# Patient Record
Sex: FEMALE | Race: BLACK OR AFRICAN AMERICAN | NOT HISPANIC OR LATINO | Employment: OTHER | URBAN - METROPOLITAN AREA
[De-identification: names, ages, dates, MRNs, and addresses within clinical notes are randomized per-mention and may not be internally consistent; named-entity substitution may affect disease eponyms.]

---

## 2017-05-27 PROCEDURE — 99283 EMERGENCY DEPT VISIT LOW MDM: CPT

## 2017-05-27 RX ORDER — NIFEDIPINE 90 MG/1
60 TABLET, EXTENDED RELEASE ORAL DAILY
COMMUNITY
End: 2017-07-24 | Stop reason: ALTCHOICE

## 2017-05-28 ENCOUNTER — HOSPITAL ENCOUNTER (EMERGENCY)
Facility: HOSPITAL | Age: 27
Discharge: HOME | End: 2017-05-28
Attending: EMERGENCY MEDICINE
Payer: MEDICARE

## 2017-05-28 VITALS
BODY MASS INDEX: 36.22 KG/M2 | DIASTOLIC BLOOD PRESSURE: 99 MMHG | TEMPERATURE: 98 F | SYSTOLIC BLOOD PRESSURE: 156 MMHG | WEIGHT: 239 LBS | HEART RATE: 68 BPM | HEIGHT: 68 IN | OXYGEN SATURATION: 99 % | RESPIRATION RATE: 18 BRPM

## 2017-05-28 DIAGNOSIS — R51.9 ACUTE INTRACTABLE HEADACHE, UNSPECIFIED HEADACHE TYPE: Primary | ICD-10-CM

## 2017-05-28 RX ORDER — BUTALBITAL, ACETAMINOPHEN AND CAFFEINE 50; 325; 40 MG/1; MG/1; MG/1
1 TABLET ORAL EVERY 4 HOURS PRN
Qty: 14 TABLET | Refills: 0 | Status: SHIPPED | OUTPATIENT
Start: 2017-05-28 | End: 2017-06-27

## 2017-05-28 RX ORDER — HYDROCHLOROTHIAZIDE 25 MG/1
25 TABLET ORAL DAILY
Qty: 30 TABLET | Refills: 0 | Status: SHIPPED | OUTPATIENT
Start: 2017-05-28 | End: 2018-11-28

## 2017-05-28 NOTE — ED PROVIDER NOTES
Encounter Date: 2017       History     Chief Complaint   Patient presents with    Headache     pt. reports headache x2 days.. bp elevated today 160/112. pt. is not taking bp medication (procardia)     Review of patient's allergies indicates:  No Known Allergies  Pt is a 27 yo female w a h/o HTN noncompliant with her BP meds. She developed preeclampsia and had a miscarriage in 3/17 she was prescribed nicardipine has only taken 3 tabs since March because she felt it was too strong. She comes today with 2 days of left sided HA. Pt declines any lab workup and want to f/u with her doctor Monday.            Past Medical History:   Diagnosis Date    Hypertension      Past Surgical History:   Procedure Laterality Date     SECTION, LOW TRANSVERSE       No family history on file.  Social History   Substance Use Topics    Smoking status: Never Smoker    Smokeless tobacco: Not on file    Alcohol use Yes      Comment: occasional     Review of Systems   Constitutional: Negative for fatigue.   HENT: Positive for congestion and rhinorrhea. Negative for ear pain and sore throat.    Eyes: Negative for visual disturbance.   Respiratory: Negative for cough, choking, chest tightness, shortness of breath, wheezing and stridor.    Cardiovascular: Negative for chest pain, palpitations and leg swelling.   Gastrointestinal: Negative for abdominal distention, abdominal pain, nausea and vomiting.   Musculoskeletal: Negative for arthralgias, neck pain and neck stiffness.   Neurological: Positive for headaches (right temporal).   All other systems reviewed and are negative.      Physical Exam     Initial Vitals [17 2342]   BP Pulse Resp Temp SpO2   138/88 75 16 98.1 °F (36.7 °C) 97 %     Physical Exam    Nursing note and vitals reviewed.  Constitutional: Vital signs are normal. She appears well-developed and well-nourished. She is not diaphoretic. She appears distressed.   HENT:   Head: Normocephalic and atraumatic.    Right Ear: External ear normal.   Left Ear: External ear normal.   Nose: Nose normal.   Mouth/Throat: Oropharynx is clear and moist.   Eyes: Conjunctivae and EOM are normal. Pupils are equal, round, and reactive to light.   Neck: Normal range of motion. Neck supple.   Cardiovascular: Normal rate, regular rhythm and normal heart sounds.   Pulmonary/Chest: Breath sounds normal. No respiratory distress. She has no wheezes. She has no rhonchi. She has no rales.   Abdominal: Soft. She exhibits no distension. There is no tenderness. There is no rebound and no guarding.   Musculoskeletal: Normal range of motion. She exhibits no edema or tenderness.   Neurological: She is alert and oriented to person, place, and time.   Skin: Skin is warm and dry.   Psychiatric: She has a normal mood and affect.         ED Course   Procedures  Labs Reviewed - No data to display                       Attending Attestation:             Attending ED Notes:   Pt refuses lab workup and wishes to be dc'd to f/u with PCP Monday.           ED Course     Clinical Impression:   The encounter diagnosis was Acute intractable headache, unspecified headache type.    Disposition:   Disposition: Discharged  Condition: Stable       Tod Restrepo MD  05/28/17 0204

## 2017-05-28 NOTE — ED NOTES
Pt presents to ED c/o HTN. States she took her BP at [facility] and it was 169/112. HA X 3 days ago. Pt reports states yesterday she took Advil for HA with slight relief. Pt denies chest pain, or SOB. Pt reports she is prescribed nifedipine for her BP, but she does not take the medication because it results in HA. Reports pain is 6/10. Pt reports she has been suffering with sinus problems X 1 month. Clear drainage from nose. Reports dry congested cough.

## 2017-07-24 ENCOUNTER — HOSPITAL ENCOUNTER (EMERGENCY)
Facility: HOSPITAL | Age: 27
Discharge: HOME | End: 2017-07-24
Attending: EMERGENCY MEDICINE
Payer: MEDICARE

## 2017-07-24 VITALS
DIASTOLIC BLOOD PRESSURE: 88 MMHG | HEART RATE: 88 BPM | HEIGHT: 68 IN | TEMPERATURE: 98 F | OXYGEN SATURATION: 96 % | RESPIRATION RATE: 18 BRPM | BODY MASS INDEX: 34.86 KG/M2 | WEIGHT: 230 LBS | SYSTOLIC BLOOD PRESSURE: 136 MMHG

## 2017-07-24 DIAGNOSIS — N76.0 ACUTE VAGINITIS: Primary | ICD-10-CM

## 2017-07-24 LAB
B-HCG UR QL: NEGATIVE
BACTERIA #/AREA URNS HPF: ABNORMAL /HPF
BILIRUB UR QL STRIP: NEGATIVE
CLARITY UR: CLEAR
COLOR UR: YELLOW
CTP QC/QA: YES
GLUCOSE UR QL STRIP: NEGATIVE
HGB UR QL STRIP: ABNORMAL
HYALINE CASTS #/AREA URNS LPF: 0 /LPF
KETONES UR QL STRIP: ABNORMAL
LEUKOCYTE ESTERASE UR QL STRIP: NEGATIVE
MICROSCOPIC COMMENT: ABNORMAL
NITRITE UR QL STRIP: NEGATIVE
PH UR STRIP: 6 [PH] (ref 5–8)
PROT UR QL STRIP: ABNORMAL
RBC #/AREA URNS HPF: 2 /HPF (ref 0–4)
SP GR UR STRIP: 1.02 (ref 1–1.03)
URN SPEC COLLECT METH UR: ABNORMAL
UROBILINOGEN UR STRIP-ACNC: 1 EU/DL
WBC #/AREA URNS HPF: 5 /HPF (ref 0–5)

## 2017-07-24 PROCEDURE — 99283 EMERGENCY DEPT VISIT LOW MDM: CPT

## 2017-07-24 PROCEDURE — 81025 URINE PREGNANCY TEST: CPT | Performed by: EMERGENCY MEDICINE

## 2017-07-24 PROCEDURE — 81000 URINALYSIS NONAUTO W/SCOPE: CPT

## 2017-07-24 RX ORDER — ASPIRIN 325 MG
1 TABLET, DELAYED RELEASE (ENTERIC COATED) ORAL NIGHTLY
Qty: 7 TUBE | Refills: 0 | Status: SHIPPED | OUTPATIENT
Start: 2017-07-24 | End: 2017-07-31

## 2017-07-24 RX ORDER — AMOXICILLIN 500 MG/1
500 CAPSULE ORAL
COMMUNITY
End: 2018-11-26

## 2017-07-25 NOTE — ED PROVIDER NOTES
Encounter Date: 2017       History     Chief Complaint   Patient presents with    Female  Problem     vaginal discomfort with burning upon urination x3 days     The patient presents to the emergency department with vaginal itching and burning today.  The patient denies a vaginal discharge.  She denies any abdominal pain.  She denies any UTI symptoms.  The patient has been on antibiotics, amoxicillin for the past 4 days for a tooth infection.          Review of patient's allergies indicates:  No Known Allergies  Past Medical History:   Diagnosis Date    Hypertension      Past Surgical History:   Procedure Laterality Date     SECTION, LOW TRANSVERSE       History reviewed. No pertinent family history.  Social History   Substance Use Topics    Smoking status: Never Smoker    Smokeless tobacco: Never Used    Alcohol use Yes      Comment: occasional     Review of Systems   Constitutional: Negative for fever.   HENT: Negative for sore throat.    Respiratory: Negative for shortness of breath.    Cardiovascular: Negative for chest pain.   Gastrointestinal: Negative for nausea.   Genitourinary: Negative for dysuria.   Musculoskeletal: Negative for back pain.   Skin: Negative for rash.   Neurological: Negative for weakness.   Hematological: Does not bruise/bleed easily.       Physical Exam     Initial Vitals [17]   BP Pulse Resp Temp SpO2   (!) 143/90 96 18 98.5 °F (36.9 °C) 98 %      MAP       107.67         Physical Exam    Nursing note and vitals reviewed.  Constitutional: She appears well-developed and well-nourished.   HENT:   Head: Normocephalic and atraumatic.   Neck: Normal range of motion. Neck supple.   Abdominal: Soft. Bowel sounds are normal. She exhibits no distension. There is no tenderness.   Genitourinary:   Genitourinary Comments: Vaginal and vulva very dry, no yeast noted. No erythema, no lesions.   Neurological: She is alert and oriented to person, place, and time.   Skin:  Skin is warm and dry. No rash noted.   Psychiatric: She has a normal mood and affect. Her behavior is normal. Judgment and thought content normal.         ED Course   Procedures  Labs Reviewed   URINALYSIS - Abnormal; Notable for the following:        Result Value    Protein, UA 2+ (*)     Ketones, UA Trace (*)     Occult Blood UA Trace (*)     All other components within normal limits   URINALYSIS MICROSCOPIC - Abnormal; Notable for the following:     Bacteria, UA Few (*)     All other components within normal limits   POCT URINE PREGNANCY             Medical Decision Making:   Clinical Tests:   Lab Tests: Ordered and Reviewed                   ED Course   Value Comment By Time   Microscopic Comment: SEE COMMENT (Reviewed) Bhavya Davis MD 07/24 3516     Clinical Impression:   The encounter diagnosis was Acute vaginitis.                           Bhavya Davis MD  07/24/17 6465

## 2017-07-25 NOTE — ED NOTES
Pt presents to ed with c/o burning with urination and vaginal itching for approximately 1 week and worsening today. Reports possible exposure to std. Denies any discharge. Denies flank pain. Denies fever. Pt is aaox4, neurologically intact, will continue to monitor.

## 2017-10-30 ENCOUNTER — HOSPITAL ENCOUNTER (EMERGENCY)
Facility: HOSPITAL | Age: 27
Discharge: LEFT BEFORE DISCHARGE | End: 2017-10-30
Attending: EMERGENCY MEDICINE
Payer: MEDICARE

## 2017-10-30 VITALS
SYSTOLIC BLOOD PRESSURE: 151 MMHG | HEIGHT: 68 IN | WEIGHT: 230 LBS | BODY MASS INDEX: 34.86 KG/M2 | HEART RATE: 86 BPM | TEMPERATURE: 99 F | DIASTOLIC BLOOD PRESSURE: 89 MMHG | RESPIRATION RATE: 20 BRPM | OXYGEN SATURATION: 98 %

## 2017-10-30 DIAGNOSIS — N84.0 ENDOMETRIAL POLYP: ICD-10-CM

## 2017-10-30 DIAGNOSIS — R10.2 PELVIC PAIN: Primary | ICD-10-CM

## 2017-10-30 LAB
ALBUMIN SERPL BCP-MCNC: 3.4 G/DL
ALP SERPL-CCNC: 53 U/L
ALT SERPL W/O P-5'-P-CCNC: 25 U/L
ANION GAP SERPL CALC-SCNC: 6 MMOL/L
AST SERPL-CCNC: 22 U/L
B-HCG UR QL: NEGATIVE
BACTERIA #/AREA URNS HPF: NORMAL /HPF
BACTERIA GENITAL QL WET PREP: ABNORMAL
BASOPHILS # BLD AUTO: 0.03 K/UL
BASOPHILS NFR BLD: 0.4 %
BILIRUB SERPL-MCNC: 0.6 MG/DL
BILIRUB UR QL STRIP: NEGATIVE
BUN SERPL-MCNC: 3 MG/DL
CALCIUM SERPL-MCNC: 9.2 MG/DL
CHLORIDE SERPL-SCNC: 111 MMOL/L
CLARITY UR: CLEAR
CLUE CELLS VAG QL WET PREP: ABNORMAL
CO2 SERPL-SCNC: 24 MMOL/L
COLOR UR: YELLOW
CREAT SERPL-MCNC: 0.7 MG/DL
CTP QC/QA: YES
DIFFERENTIAL METHOD: ABNORMAL
EOSINOPHIL # BLD AUTO: 0.7 K/UL
EOSINOPHIL NFR BLD: 8.3 %
ERYTHROCYTE [DISTWIDTH] IN BLOOD BY AUTOMATED COUNT: 13.8 %
EST. GFR  (AFRICAN AMERICAN): >60 ML/MIN/1.73 M^2
EST. GFR  (NON AFRICAN AMERICAN): >60 ML/MIN/1.73 M^2
FILAMENT FUNGI VAG WET PREP-#/AREA: ABNORMAL
GLUCOSE SERPL-MCNC: 95 MG/DL
GLUCOSE UR QL STRIP: NEGATIVE
HCG INTACT+B SERPL-ACNC: 15 MIU/ML
HCT VFR BLD AUTO: 38.5 %
HGB BLD-MCNC: 12.7 G/DL
HGB UR QL STRIP: NEGATIVE
HYALINE CASTS #/AREA URNS LPF: 0 /LPF
KETONES UR QL STRIP: NEGATIVE
LEUKOCYTE ESTERASE UR QL STRIP: NEGATIVE
LYMPHOCYTES # BLD AUTO: 3.3 K/UL
LYMPHOCYTES NFR BLD: 40.3 %
MCH RBC QN AUTO: 29.8 PG
MCHC RBC AUTO-ENTMCNC: 33 G/DL
MCV RBC AUTO: 90 FL
MICROSCOPIC COMMENT: NORMAL
MONOCYTES # BLD AUTO: 0.7 K/UL
MONOCYTES NFR BLD: 8.1 %
NEUTROPHILS # BLD AUTO: 3.5 K/UL
NEUTROPHILS NFR BLD: 42.8 %
NITRITE UR QL STRIP: NEGATIVE
PH UR STRIP: 6 [PH] (ref 5–8)
PLATELET # BLD AUTO: 289 K/UL
PMV BLD AUTO: 9.3 FL
POTASSIUM SERPL-SCNC: 3.8 MMOL/L
PROT SERPL-MCNC: 7.1 G/DL
PROT UR QL STRIP: ABNORMAL
RBC # BLD AUTO: 4.26 M/UL
RBC #/AREA URNS HPF: 1 /HPF (ref 0–4)
SODIUM SERPL-SCNC: 141 MMOL/L
SP GR UR STRIP: >=1.03 (ref 1–1.03)
SPECIMEN SOURCE: ABNORMAL
T VAGINALIS GENITAL QL WET PREP: ABNORMAL
URN SPEC COLLECT METH UR: ABNORMAL
UROBILINOGEN UR STRIP-ACNC: NEGATIVE EU/DL
WBC # BLD AUTO: 8.28 K/UL
WBC #/AREA URNS HPF: 2 /HPF (ref 0–5)
WBC #/AREA VAG WET PREP: ABNORMAL
YEAST GENITAL QL WET PREP: ABNORMAL

## 2017-10-30 PROCEDURE — 99284 EMERGENCY DEPT VISIT MOD MDM: CPT | Mod: 25

## 2017-10-30 PROCEDURE — 96360 HYDRATION IV INFUSION INIT: CPT

## 2017-10-30 PROCEDURE — 87210 SMEAR WET MOUNT SALINE/INK: CPT

## 2017-10-30 PROCEDURE — 25000003 PHARM REV CODE 250: Performed by: EMERGENCY MEDICINE

## 2017-10-30 PROCEDURE — 85025 COMPLETE CBC W/AUTO DIFF WBC: CPT

## 2017-10-30 PROCEDURE — 87591 N.GONORRHOEAE DNA AMP PROB: CPT

## 2017-10-30 PROCEDURE — 81000 URINALYSIS NONAUTO W/SCOPE: CPT

## 2017-10-30 PROCEDURE — 84702 CHORIONIC GONADOTROPIN TEST: CPT

## 2017-10-30 PROCEDURE — 81025 URINE PREGNANCY TEST: CPT | Performed by: EMERGENCY MEDICINE

## 2017-10-30 PROCEDURE — 80053 COMPREHEN METABOLIC PANEL: CPT

## 2017-10-30 RX ORDER — SODIUM CHLORIDE 9 MG/ML
125 INJECTION, SOLUTION INTRAVENOUS CONTINUOUS
Status: DISCONTINUED | OUTPATIENT
Start: 2017-10-30 | End: 2017-10-30

## 2017-10-30 RX ADMIN — SODIUM CHLORIDE 1000 ML: 0.9 INJECTION, SOLUTION INTRAVENOUS at 03:10

## 2017-10-30 NOTE — ED NOTES
Pt requesting to leave MD BROWN notified.  Pt refusing to sign AMA paperwork, witnessed by this RN and Ligia WEBB

## 2017-10-30 NOTE — ED TRIAGE NOTES
Pt reports lower pelvic and vaginal cramping for the past 4 days.  Pt denies vaginal bleeding or discharge.  Unsure of pregnancy status.

## 2017-10-31 LAB
C TRACH DNA SPEC QL NAA+PROBE: NOT DETECTED
N GONORRHOEA DNA SPEC QL NAA+PROBE: NOT DETECTED

## 2017-11-16 NOTE — ED PROVIDER NOTES
Encounter Date: 10/30/2017       History     Chief Complaint   Patient presents with    Female  Problem     26 year old female presents to ed cc of vaginal cramping. patient states cramping began 4 days ago denies vaginal discharge.      The history is provided by the patient.   Female  Problem   Primary symptoms include pelvic pain.    Primary symptoms include no fever, no dysuria, and no vaginal bleeding.   This is a new problem. The current episode started several days ago. The problem occurs constantly. The problem has been gradually improving. The symptoms occur spontaneously. She is not pregnant. LMP: LMP 10/4/17. Pertinent negatives include no anorexia, no diaphoresis, no abdominal swelling, no abdominal pain, no constipation, no diarrhea, no nausea, no vomiting, no frequency, no light-headedness and no dizziness. She has tried nothing for the symptoms. Sexual activity: sexually active. She uses nothing for contraception.     Review of patient's allergies indicates:  No Known Allergies  Past Medical History:   Diagnosis Date    Hypertension      Past Surgical History:   Procedure Laterality Date     SECTION, LOW TRANSVERSE       History reviewed. No pertinent family history.  Social History   Substance Use Topics    Smoking status: Never Smoker    Smokeless tobacco: Never Used    Alcohol use Yes      Comment: occasional     Review of Systems   Constitutional: Negative for diaphoresis and fever.   HENT: Negative for sore throat.    Respiratory: Negative for shortness of breath.    Cardiovascular: Negative for chest pain.   Gastrointestinal: Negative for abdominal pain, anorexia, constipation, diarrhea, nausea and vomiting.   Genitourinary: Positive for pelvic pain and vaginal pain. Negative for dysuria, frequency, urgency, vaginal bleeding and vaginal discharge.   Musculoskeletal: Negative for back pain.   Skin: Negative for rash.   Neurological: Negative for dizziness, weakness and  light-headedness.   Hematological: Does not bruise/bleed easily.   All other systems reviewed and are negative.      Physical Exam     Initial Vitals [10/30/17 1414]   BP Pulse Resp Temp SpO2   (!) 151/89 86 20 98.9 °F (37.2 °C) 98 %      MAP       109.67         Physical Exam    Nursing note and vitals reviewed.  Constitutional: She appears well-developed and well-nourished.   HENT:   Head: Normocephalic and atraumatic.   Eyes: Conjunctivae and EOM are normal. Pupils are equal, round, and reactive to light. Right eye exhibits no discharge. Left eye exhibits no discharge. No scleral icterus.   Neck: Normal range of motion. Neck supple.   Cardiovascular: Normal rate, regular rhythm and normal heart sounds. Exam reveals no gallop and no friction rub.    No murmur heard.  Pulmonary/Chest: Breath sounds normal. No stridor. No respiratory distress. She has no wheezes. She has no rhonchi. She has no rales.   Abdominal: Soft. Bowel sounds are normal. She exhibits no distension and no mass. There is no tenderness. There is no rebound and no guarding.   Genitourinary: Vagina normal and uterus normal. No vaginal discharge found.   Neurological: She is alert and oriented to person, place, and time. She has normal strength. No cranial nerve deficit or sensory deficit.   Skin: Skin is warm and dry.   Psychiatric: She has a normal mood and affect. Her behavior is normal. Judgment and thought content normal.         ED Course   Procedures  Labs Reviewed   URINALYSIS - Abnormal; Notable for the following:        Result Value    Specific Gravity, UA >=1.030 (*)     Protein, UA 1+ (*)     All other components within normal limits   CBC W/ AUTO DIFFERENTIAL - Abnormal; Notable for the following:     Eos # 0.7 (*)     Eosinophil% 8.3 (*)     All other components within normal limits   COMPREHENSIVE METABOLIC PANEL - Abnormal; Notable for the following:     Chloride 111 (*)     BUN, Bld 3 (*)     Albumin 3.4 (*)     Alkaline Phosphatase  53 (*)     Anion Gap 6 (*)     All other components within normal limits   VAGINAL SCREEN - Abnormal; Notable for the following:     WBC - Vaginal Screen Rare (*)     Bacteria - Vaginal Screen Few (*)     All other components within normal limits   C. TRACHOMATIS/N. GONORRHOEAE BY AMP DNA   URINALYSIS MICROSCOPIC   HCG, QUANTITATIVE, PREGNANCY   POCT URINE PREGNANCY        Imaging Results          US Pelvis Comp with Transvag NON-OB (xpd (Final result)     Abnormal  Result time 10/30/17 16:49:13    Final result by Debbie Seymour MD (10/30/17 16:49:13)                 Impression:      1.  Slightly thickened endometrium with endometrial heterogeneity this focally near the fundus, could represent a small polyp.  Followup after treatment recommended to assure resolution.    EPIC notification system activated.                   Electronically signed by: DEBBIE SEYMOUR MD  Date:     10/30/17  Time:    16:49              Narrative:    Pelvic ultrasound complete with transvaginal, non-OB    Comparison: None    Results: Transabdominal followed by transvaginal ultrasound of the pelvis obtained.      The uterus  measures  9.2 x 5.2 x 6.0 cm .    The endometrium measures 15 mm, which is mildly thickened.  Small area of heterogeneous echogenicity within the endometrium, an area measuring 1.2 x 0.9 x 1.2 cm could represent a small polyp.  The right ovary measures 4.8 x 3.2 x 2.1 cm.     The left ovary measures 4.3 x 2.8 x 2.1 cm.     Normal flow is seen to the bilateral ovaries.  Trace of free fluid in the cul-de-sac                                 Medical Decision Making:   Differential Diagnosis:   Vaginal infection such as yeast infection, vaginitis, topical irritant, bacterial vaginosis, STD such as gonorrhea, chlamydia, UTI, discomfort of pregnancy, ectopic pregnancy, ovarian cysts, ovarian torsion, malignancy, constipation, colitis, diverticulitis    ED Management:  Patient left AMA prior to results but I  contacted her about her ultrasound report and she reported she will f/u with PCP and her OB                   ED Course      Clinical Impression:   The primary encounter diagnosis was Pelvic pain. A diagnosis of Endometrial polyp was also pertinent to this visit.    Disposition:   Disposition: Eloped  Condition: Stable                        Lissa Hoffman MD  11/16/17 0752

## 2018-11-26 ENCOUNTER — HOSPITAL ENCOUNTER (EMERGENCY)
Facility: HOSPITAL | Age: 28
Discharge: HOME | End: 2018-11-26
Attending: EMERGENCY MEDICINE
Payer: COMMERCIAL

## 2018-11-26 VITALS
DIASTOLIC BLOOD PRESSURE: 80 MMHG | BODY MASS INDEX: 36.1 KG/M2 | OXYGEN SATURATION: 99 % | WEIGHT: 230 LBS | HEIGHT: 67 IN | HEART RATE: 90 BPM | RESPIRATION RATE: 18 BRPM | TEMPERATURE: 98 F | SYSTOLIC BLOOD PRESSURE: 150 MMHG

## 2018-11-26 DIAGNOSIS — N93.8 DUB (DYSFUNCTIONAL UTERINE BLEEDING): ICD-10-CM

## 2018-11-26 DIAGNOSIS — N94.6 DYSMENORRHEA: Primary | ICD-10-CM

## 2018-11-26 LAB
B-HCG UR QL: NEGATIVE
BACTERIA #/AREA URNS HPF: ABNORMAL /HPF
BACTERIA GENITAL QL WET PREP: ABNORMAL
BILIRUB UR QL STRIP: NEGATIVE
CLARITY UR: CLEAR
CLUE CELLS VAG QL WET PREP: ABNORMAL
COLOR UR: ABNORMAL
CTP QC/QA: YES
FILAMENT FUNGI VAG WET PREP-#/AREA: ABNORMAL
GLUCOSE UR QL STRIP: NEGATIVE
HGB UR QL STRIP: ABNORMAL
HYALINE CASTS #/AREA URNS LPF: 0 /LPF
KETONES UR QL STRIP: NEGATIVE
LEUKOCYTE ESTERASE UR QL STRIP: NEGATIVE
MICROSCOPIC COMMENT: ABNORMAL
NITRITE UR QL STRIP: NEGATIVE
PH UR STRIP: 7 [PH] (ref 5–8)
PROT UR QL STRIP: ABNORMAL
RBC #/AREA URNS HPF: 50 /HPF (ref 0–4)
SP GR UR STRIP: 1.02 (ref 1–1.03)
SPECIMEN SOURCE: ABNORMAL
T VAGINALIS GENITAL QL WET PREP: ABNORMAL
URN SPEC COLLECT METH UR: ABNORMAL
UROBILINOGEN UR STRIP-ACNC: NEGATIVE EU/DL
WBC #/AREA URNS HPF: 0 /HPF (ref 0–5)
WBC #/AREA VAG WET PREP: ABNORMAL
YEAST GENITAL QL WET PREP: ABNORMAL

## 2018-11-26 PROCEDURE — 87210 SMEAR WET MOUNT SALINE/INK: CPT

## 2018-11-26 PROCEDURE — 87491 CHLMYD TRACH DNA AMP PROBE: CPT

## 2018-11-26 PROCEDURE — 81000 URINALYSIS NONAUTO W/SCOPE: CPT

## 2018-11-26 PROCEDURE — 81025 URINE PREGNANCY TEST: CPT | Performed by: NURSE PRACTITIONER

## 2018-11-26 PROCEDURE — 25000003 PHARM REV CODE 250: Performed by: PHYSICIAN ASSISTANT

## 2018-11-26 PROCEDURE — 99284 EMERGENCY DEPT VISIT MOD MDM: CPT | Mod: 25

## 2018-11-26 RX ORDER — CETIRIZINE HYDROCHLORIDE 10 MG/1
10 TABLET ORAL DAILY
COMMUNITY
End: 2018-11-28

## 2018-11-26 RX ORDER — KETOROLAC TROMETHAMINE 10 MG/1
10 TABLET, FILM COATED ORAL EVERY 6 HOURS
Qty: 12 TABLET | Refills: 0 | Status: SHIPPED | OUTPATIENT
Start: 2018-11-26 | End: 2018-11-29

## 2018-11-26 RX ORDER — METFORMIN HYDROCHLORIDE 500 MG/1
500 TABLET ORAL 2 TIMES DAILY WITH MEALS
COMMUNITY

## 2018-11-26 RX ORDER — KETOROLAC TROMETHAMINE 10 MG/1
10 TABLET, FILM COATED ORAL
Status: COMPLETED | OUTPATIENT
Start: 2018-11-26 | End: 2018-11-26

## 2018-11-26 RX ADMIN — KETOROLAC TROMETHAMINE 10 MG: 10 TABLET, FILM COATED ORAL at 06:11

## 2018-11-27 LAB
C TRACH DNA SPEC QL NAA+PROBE: NOT DETECTED
N GONORRHOEA DNA SPEC QL NAA+PROBE: NOT DETECTED

## 2018-11-27 NOTE — ED PROVIDER NOTES
Encounter Date: 2018       History     Chief Complaint   Patient presents with    Pelvic Pain     c/o pelvic pain and vaginal spotting since yesterday. Unsure if pregnant. LMP was in early October     Afebrile 28-year-old female presents the ED for evaluation of lower pelvic pain that began yesterday.  Patient reports that the pain is constant.  She describes it as some fullness with occasional start sensation.  She also reports some vaginal spotting.  She states that she is unsure F she is pregnant as her last menses was in early October.  She does report a history of irregular menses.  She denies any other symptoms at this time including fever, chills, nausea, vomiting, change in stool, dysuria, hematuria or vaginal discharge. She does report that she has had axillary active however denies any concern of STI or any previous STI.  She has not tried any medications for the symptoms.      The history is provided by the patient.     Review of patient's allergies indicates:  No Known Allergies  Past Medical History:   Diagnosis Date    Diabetes mellitus     Hypertension      Past Surgical History:   Procedure Laterality Date     SECTION, LOW TRANSVERSE       No family history on file.  Social History     Tobacco Use    Smoking status: Never Smoker    Smokeless tobacco: Never Used   Substance Use Topics    Alcohol use: Yes     Comment: occasional    Drug use: No     Review of Systems   Constitutional: Negative for chills and fever.   Cardiovascular: Negative for chest pain.   Gastrointestinal: Negative for abdominal pain, nausea and vomiting.   Genitourinary: Positive for pelvic pain and vaginal bleeding. Negative for dysuria, flank pain, frequency, hematuria and vaginal discharge.   Musculoskeletal: Negative for arthralgias, back pain and myalgias.   Skin: Negative for rash.   Allergic/Immunologic: Negative for immunocompromised state.   Neurological: Negative for weakness and headaches.    Hematological: Does not bruise/bleed easily.       Physical Exam     Initial Vitals [11/26/18 1712]   BP Pulse Resp Temp SpO2   (!) 150/99 98 18 98.1 °F (36.7 °C) 99 %      MAP       --         Physical Exam    Nursing note and vitals reviewed.  Constitutional: Vital signs are normal. She appears well-developed and well-nourished. She is cooperative.  Non-toxic appearance. She does not appear ill. No distress.   HENT:   Head: Normocephalic and atraumatic.   Eyes: Conjunctivae and lids are normal.   Neck: Neck supple. No neck rigidity.   Cardiovascular: Normal rate and regular rhythm.   Pulmonary/Chest: Breath sounds normal. No respiratory distress. She has no wheezes. She has no rhonchi.   Abdominal: Soft. Normal appearance and bowel sounds are normal. There is no tenderness. There is no rigidity and no guarding.   Genitourinary: Pelvic exam was performed with patient supine. Cervix exhibits no motion tenderness and no discharge. Right adnexum displays no tenderness. Left adnexum displays no tenderness.   Genitourinary Comments: Scant blood in the vaginal vault; no cervical motion tenderness or discharge. No adnexal tenderness, fullness and no uterine tenderness.   Musculoskeletal: Normal range of motion.   Neurological: She is alert and oriented to person, place, and time. GCS eye subscore is 4. GCS verbal subscore is 5. GCS motor subscore is 6.   Skin: Skin is warm, dry and intact. No rash noted.   Psychiatric: She has a normal mood and affect. Her speech is normal and behavior is normal. Thought content normal.         ED Course   Procedures  Labs Reviewed   URINALYSIS, REFLEX TO URINE CULTURE - Abnormal; Notable for the following components:       Result Value    Protein, UA 1+ (*)     Occult Blood UA 3+ (*)     All other components within normal limits    Narrative:     Preferred Collection Type->Urine, Clean Catch   URINALYSIS MICROSCOPIC - Abnormal; Notable for the following components:    RBC, UA 50 (*)      All other components within normal limits    Narrative:     Preferred Collection Type->Urine, Clean Catch   VAGINAL SCREEN - Abnormal; Notable for the following components:    Bacteria - Vaginal Screen Rare (*)     All other components within normal limits   C. TRACHOMATIS/N. GONORRHOEAE BY AMP DNA   POCT URINE PREGNANCY          Imaging Results    None          Medical Decision Making:   Initial Assessment:   Well-appearing female presents the ED for evaluation of pelvic pain that began yesterday.  She appears in no distress.  Abdomen is soft and nontender.  exam reveals Scant blood in the vaginal vault; no cervical motion tenderness or discharge. No adnexal tenderness, fullness and no uterine tenderness.  Differential Diagnosis:   Differential Diagnosis includes, but is not limited to:  Pregnancy complication (threatened AB, inevitable AB, incomplete Ab, missed AB, ectopic pregnancy, placenta previa) normal menses, STD, foreign body, trauma.      Clinical Tests:   Lab Tests: Ordered and Reviewed  ED Management:  UPT is negative.  UA is unremarkable with exception of red blood cells consistent with reported vaginal bleeding.  Vaginal screen with only rare bacteria.  Did discuss empiric treatment for STI however as low clinical suspicion and patient with low suspicion we will await G/C for any treatment. Strict instructions to follow up with primary care physician or reference provided for further assessment and evaluation. Given instructions to return for any acute symptoms and verbalized understanding of this medical plan.                     ED Course as of Nov 26 1806   Mon Nov 26, 2018   1710 Triage Sort Note: Christa Falk, a nontoxic/well appearing, 28 y.o. female, presented to the ED with c/o pelvic pain and spotting that began yesterday. Pt is unsure if she is pregnant. LMP was 10/2/18.    Patient seen and medically screened by Nurse Practitioner in triage. Orders initiated at triage to expedite  care. Care will be transferred to an alternate provider when patient was placed in an Exam Room from the [facility] for physical exam, additional orders, and disposition.  5:10 PM Jessica Pal DNP, FNP-BC      [AT]   1713 BP: (!) 150/99 [AT]   1713 Temp: 98.1 °F (36.7 °C) [AT]   1713 Temp src: Oral [AT]   1713 Pulse: 98 [AT]   1713 Resp: 18 [AT]   1713 SpO2: 99 % [AT]      ED Course User Index  [AT] TRI Grace     Clinical Impression:   The primary encounter diagnosis was Dysmenorrhea. A diagnosis of DUB (dysfunctional uterine bleeding) was also pertinent to this visit.                             TABITHA Stauffer  11/26/18 [id number]

## 2018-11-27 NOTE — ED NOTES
APPEARANCE: Alert, oriented and in no acute distress.  CARDIAC: Normal rate and rhythm, no murmur heard.   PERIPHERAL VASCULAR: peripheral pulses present. Normal cap refill. No edema. Warm to touch.    RESPIRATORY:Normal rate and effort, breath sounds clear bilaterally throughout chest. Respirations are equal and unlabored no obvious signs of distress.  GASTRO: soft, bowel sounds normal, no tenderness, no abdominal distention.  MUSC: Full ROM. No bony tenderness or soft tissue tenderness. No obvious deformity. Pelvic pain.   SKIN: Skin is warm and dry, normal skin turgor, mucous membranes moist.  NEURO: 5/5 strength major flexors/extensors bilaterally. Sensory intact to light touch bilaterally. Cinthia coma scale: eyes open spontaneously-4, oriented & converses-5, obeys commands-6. No neurological abnormalities.   MENTAL STATUS: awake, alert and aware of environment.  EYE: PERRL, both eyes: pupils brisk and reactive to light. Normal size.  ENT: EARS: no obvious drainage. NOSE: no active bleeding.   BREAST: symmetrical. No masses. No tenderness.  GENITALIA: Normal external genitalia.  Pt complains of vaginal spotting and pelvic pain since yesterday.

## 2018-11-28 ENCOUNTER — HOSPITAL ENCOUNTER (EMERGENCY)
Facility: HOSPITAL | Age: 28
Discharge: HOME | End: 2018-11-28
Attending: EMERGENCY MEDICINE
Payer: COMMERCIAL

## 2018-11-28 VITALS
HEART RATE: 86 BPM | TEMPERATURE: 100 F | DIASTOLIC BLOOD PRESSURE: 83 MMHG | SYSTOLIC BLOOD PRESSURE: 132 MMHG | HEIGHT: 67 IN | BODY MASS INDEX: 36.1 KG/M2 | OXYGEN SATURATION: 99 % | WEIGHT: 230 LBS | RESPIRATION RATE: 16 BRPM

## 2018-11-28 DIAGNOSIS — R05.9 COUGH: ICD-10-CM

## 2018-11-28 DIAGNOSIS — J06.9 UPPER RESPIRATORY TRACT INFECTION, UNSPECIFIED TYPE: Primary | ICD-10-CM

## 2018-11-28 LAB
B-HCG UR QL: NEGATIVE
CTP QC/QA: YES
DEPRECATED S PYO AG THROAT QL EIA: NEGATIVE
FLUAV AG SPEC QL IA: NEGATIVE
FLUBV AG SPEC QL IA: NEGATIVE
POCT GLUCOSE: 99 MG/DL (ref 70–110)
SPECIMEN SOURCE: NORMAL

## 2018-11-28 PROCEDURE — 99284 EMERGENCY DEPT VISIT MOD MDM: CPT | Mod: 25

## 2018-11-28 PROCEDURE — 87081 CULTURE SCREEN ONLY: CPT

## 2018-11-28 PROCEDURE — 81025 URINE PREGNANCY TEST: CPT | Performed by: NURSE PRACTITIONER

## 2018-11-28 PROCEDURE — 82962 GLUCOSE BLOOD TEST: CPT

## 2018-11-28 PROCEDURE — 87880 STREP A ASSAY W/OPTIC: CPT

## 2018-11-28 PROCEDURE — 87400 INFLUENZA A/B EACH AG IA: CPT

## 2018-11-28 PROCEDURE — 25000003 PHARM REV CODE 250: Performed by: NURSE PRACTITIONER

## 2018-11-28 RX ORDER — FLUTICASONE PROPIONATE 50 MCG
1 SPRAY, SUSPENSION (ML) NASAL 2 TIMES DAILY PRN
Qty: 15 G | Refills: 0 | Status: SHIPPED | OUTPATIENT
Start: 2018-11-28

## 2018-11-28 RX ORDER — CETIRIZINE HYDROCHLORIDE 10 MG/1
10 TABLET ORAL DAILY
Qty: 14 TABLET | Refills: 0 | Status: SHIPPED | OUTPATIENT
Start: 2018-11-28 | End: 2019-11-28

## 2018-11-28 RX ORDER — BENZONATATE 100 MG/1
100 CAPSULE ORAL 3 TIMES DAILY PRN
Qty: 20 CAPSULE | Refills: 0 | Status: SHIPPED | OUTPATIENT
Start: 2018-11-28 | End: 2018-12-08

## 2018-11-28 RX ORDER — IBUPROFEN 600 MG/1
600 TABLET ORAL
Status: COMPLETED | OUTPATIENT
Start: 2018-11-28 | End: 2018-11-28

## 2018-11-28 RX ORDER — ACETAMINOPHEN 500 MG
1000 TABLET ORAL
Status: COMPLETED | OUTPATIENT
Start: 2018-11-28 | End: 2018-11-28

## 2018-11-28 RX ADMIN — ACETAMINOPHEN 1000 MG: 500 TABLET ORAL at 09:11

## 2018-11-28 RX ADMIN — IBUPROFEN 600 MG: 600 TABLET, FILM COATED ORAL at 11:11

## 2018-11-29 NOTE — DISCHARGE INSTRUCTIONS
Take prescribed medications as labeled as needed.  Hydrate well with oral fluids and get plenty of rest.  Take OTC Tylenol or ibuprofen as labeled as needed for pain or fever.  Follow up with PCP in 1-2 days return to ED for any concerns or worsening symptoms.

## 2018-11-29 NOTE — ED PROVIDER NOTES
Encounter Date: 2018       History     Chief Complaint   Patient presents with    Generalized Body Aches     Pt. c/o generalized body aches with sore throat, bilateral ear pain and fever for two days.     Patient 28-year-old female with past medical history of diabetes and hypertension who presents to ED for evaluation of fever, chills, body aches, cough/congestion, sore throat, and bilateral ear pain x2 days. Denies injury or trauma. Denies sick contacts. Reports taking over-the-counter medications with no improvement of symptoms.  Patient denies any alleviating exacerbating factors.  Denies neck pain/stiffness, SOB, chest pain, nausea, vomiting, diarrhea, abdominal pain, and rash.  Denies any other complaints at this time.      The history is provided by the patient.     Review of patient's allergies indicates:  No Known Allergies  Past Medical History:   Diagnosis Date    Diabetes mellitus     Hypertension      Past Surgical History:   Procedure Laterality Date     SECTION, LOW TRANSVERSE       History reviewed. No pertinent family history.  Social History     Tobacco Use    Smoking status: Never Smoker    Smokeless tobacco: Never Used   Substance Use Topics    Alcohol use: Yes     Comment: occasional    Drug use: No     Review of Systems   Constitutional: Positive for chills and fever.   HENT: Positive for congestion, ear pain, postnasal drip and rhinorrhea. Negative for drooling, ear discharge, facial swelling, mouth sores, sinus pressure, sinus pain, sneezing, sore throat, trouble swallowing and voice change.    Respiratory: Positive for cough.    Gastrointestinal: Negative for abdominal pain, diarrhea, nausea and vomiting.   Musculoskeletal: Positive for myalgias. Negative for back pain, neck pain and neck stiffness.   Skin: Negative for rash.   Neurological: Positive for headaches.   Hematological: Does not bruise/bleed easily.   All other systems reviewed and are  "negative.      Physical Exam     Initial Vitals [11/28/18 2103]   BP Pulse Resp Temp SpO2   (!) 158/90 97 16 (!) 102.6 °F (39.2 °C) 100 %      MAP       --         Physical Exam    Vitals reviewed.  Constitutional: She appears well-developed and well-nourished. She is cooperative.  Non-toxic appearance. She does not have a sickly appearance. She appears ill.   HENT:   Head: Atraumatic.   Right Ear: Hearing normal. A middle ear effusion is present.   Left Ear: Hearing normal. A middle ear effusion is present.   Nose: Mucosal edema and rhinorrhea (Clear) present.   Mouth/Throat: Uvula is midline and mucous membranes are normal. Posterior oropharyngeal erythema present. No oropharyngeal exudate, posterior oropharyngeal edema or tonsillar abscesses.   No mouth sores.   Eyes: EOM are normal.   Neck: Normal range of motion, full passive range of motion without pain and phonation normal. Neck supple.   No meningismus.    Cardiovascular: Regular rhythm.   Asymptomatic hypertension.   Pulmonary/Chest: Effort normal and breath sounds normal. No respiratory distress.   Abdominal: Bowel sounds are normal.   Lymphadenopathy:     She has no cervical adenopathy.   Neurological: She is alert and oriented to person, place, and time. Gait normal.   Skin: Skin is warm, dry and intact. No rash noted.   Psychiatric: She has a normal mood and affect.         ED Course   Procedures  Labs Reviewed   THROAT SCREEN, RAPID   CULTURE, STREP A,  THROAT   INFLUENZA A AND B ANTIGEN   POCT URINE PREGNANCY   POCT GLUCOSE          Imaging Results          X-Ray Chest PA And Lateral (Final result)  Result time 11/28/18 22:53:56    Final result by Jim Chiang MD (11/28/18 22:53:56)                 Impression:      No acute cardiopulmonary finding.      Electronically signed by: Jim Chiang MD  Date:    11/28/2018  Time:    22:53             Narrative:    EXAMINATION:  XR CHEST PA AND LATERAL    CLINICAL HISTORY:  Provided history is "  " "Cough".    TECHNIQUE:  Frontal and lateral views of the chest were performed.    COMPARISON:  None.    FINDINGS:  Relatively low lung volumes.  Cardiac silhouette is not enlarged. Mildly increased density at the lung bases, favored to be on the basis of breast attenuation.  No focal consolidation.  No sizable pleural effusion.  No pneumothorax.                                 Medical Decision Making:   History:   Old Medical Records: I decided to obtain old medical records.  Initial Assessment:   Patient presents to ED with URI symptoms x2 days.  Appears ill and nontoxic. Febrile.  Clinical Tests:   Lab Tests: Reviewed and Ordered  Radiological Study: Reviewed and Ordered  ED Management:  POCT pregnancy, CXR, strep swab, flu swab, PO ibuprofen, tylenol, POCT glucose   UPT negative.  No signs of pneumonia or meningitis.  Asymptomatic hypertension.  Patient's signs and symptoms most likely due to upper respiratory infection.  Patient is hemodynamically stable, temperature 100.4, and will be DC home with prescriptions for Tessalon Perles, Flonase, and Zyrtec.  Patient refuses a Decadron injection at this time.  Patient instructed to take prescribed medications as labeled as needed.  Instructed to hydrate well with oral fluids and get plenty of rest and to take OTC Tylenol or ibuprofen as labeled as needed for pain or fever.  Instructed to follow up with PCP in 1-2 days return to ED for any concerns or worsening symptoms.  Patient verbalizes DC instructions and is in compliance in agreement with treatment plan.              Attending Attestation:     Physician Attestation Statement for NP/PA:       Other NP/PA Attestation Additions:      Medical Decision Making: The patient presents emergency department with URI symptoms for the past few days with nasal congestion and effusion to her ear.  The patient refuses a Decadron injection but will be discharged with Flonase and some other over-the-counter medications.            "         Clinical Impression:   The primary encounter diagnosis was Upper respiratory tract infection, unspecified type. A diagnosis of Cough was also pertinent to this visit.                             Bhavya Davis MD  11/29/18 0011       Araivnd Crawford NP  11/29/18 0031

## 2018-12-01 LAB — BACTERIA THROAT CULT: NORMAL

## 2019-02-12 ENCOUNTER — HOSPITAL ENCOUNTER (EMERGENCY)
Facility: HOSPITAL | Age: 29
Discharge: HOME | End: 2019-02-13
Attending: EMERGENCY MEDICINE
Payer: COMMERCIAL

## 2019-02-12 DIAGNOSIS — N76.0 VULVOVAGINITIS: Primary | ICD-10-CM

## 2019-02-12 PROCEDURE — 99284 EMERGENCY DEPT VISIT MOD MDM: CPT | Mod: 25

## 2019-02-12 PROCEDURE — 82962 GLUCOSE BLOOD TEST: CPT

## 2019-02-13 VITALS
OXYGEN SATURATION: 94 % | WEIGHT: 230 LBS | DIASTOLIC BLOOD PRESSURE: 79 MMHG | TEMPERATURE: 98 F | BODY MASS INDEX: 36.1 KG/M2 | RESPIRATION RATE: 16 BRPM | HEIGHT: 67 IN | HEART RATE: 65 BPM | SYSTOLIC BLOOD PRESSURE: 125 MMHG

## 2019-02-13 LAB
BACTERIA #/AREA URNS HPF: ABNORMAL /HPF
BACTERIA GENITAL QL WET PREP: NORMAL
BILIRUB UR QL STRIP: NEGATIVE
CLARITY UR: CLEAR
CLUE CELLS VAG QL WET PREP: NORMAL
COLOR UR: YELLOW
FILAMENT FUNGI VAG WET PREP-#/AREA: NORMAL
GLUCOSE UR QL STRIP: NEGATIVE
HGB UR QL STRIP: NEGATIVE
HYALINE CASTS #/AREA URNS LPF: 22 /LPF
KETONES UR QL STRIP: ABNORMAL
LEUKOCYTE ESTERASE UR QL STRIP: NEGATIVE
MICROSCOPIC COMMENT: ABNORMAL
NITRITE UR QL STRIP: NEGATIVE
PH UR STRIP: 6 [PH] (ref 5–8)
POCT GLUCOSE: 101 MG/DL (ref 70–110)
PROT UR QL STRIP: ABNORMAL
RBC #/AREA URNS HPF: 4 /HPF (ref 0–4)
SP GR UR STRIP: 1.02 (ref 1–1.03)
SPECIMEN SOURCE: NORMAL
T VAGINALIS GENITAL QL WET PREP: NORMAL
URN SPEC COLLECT METH UR: ABNORMAL
UROBILINOGEN UR STRIP-ACNC: NEGATIVE EU/DL
WBC #/AREA URNS HPF: 2 /HPF (ref 0–5)
WBC #/AREA VAG WET PREP: NORMAL
YEAST GENITAL QL WET PREP: NORMAL

## 2019-02-13 PROCEDURE — 87210 SMEAR WET MOUNT SALINE/INK: CPT

## 2019-02-13 PROCEDURE — 81000 URINALYSIS NONAUTO W/SCOPE: CPT

## 2019-02-13 NOTE — ED PROVIDER NOTES
Encounter Date: 2019       History     Chief Complaint   Patient presents with    Vaginal Itching     vaginal irritaion x5 days. denies itching.      27 yo female presents to the ED with complaints of vaginal irritation x 5 days. She states that the area was initially red and burned. She has been applying elvin stat topical cream and it has improved. Pain is worse with urination and wiping. She states her sexual partner did use a different type of lubrication last week, but denies any other new body care products. Denies vaginal discharge, lesions, pelvic pain.       The history is provided by the patient. No  was used.     Review of patient's allergies indicates:  No Known Allergies  Past Medical History:   Diagnosis Date    Diabetes mellitus     Hypertension      Past Surgical History:   Procedure Laterality Date     SECTION, LOW TRANSVERSE       No family history on file.  Social History     Tobacco Use    Smoking status: Never Smoker    Smokeless tobacco: Never Used   Substance Use Topics    Alcohol use: Yes     Comment: occasional    Drug use: No     Review of Systems   Genitourinary: Negative for dysuria, vaginal bleeding and vaginal discharge.        Vaginal irritation   All other systems reviewed and are negative.      Physical Exam     Initial Vitals [19 2304]   BP Pulse Resp Temp SpO2   (!) 142/95 88 20 98.7 °F (37.1 °C) (!) 86 %      MAP       --         Physical Exam    Nursing note and vitals reviewed.  Constitutional: Vital signs are normal. She appears well-developed and well-nourished. No distress.   HENT:   Head: Normocephalic and atraumatic.   Nose: Nose normal.   Eyes: Conjunctivae and lids are normal.   Neck: Normal range of motion and phonation normal.   Cardiovascular: Normal rate.   Pulmonary/Chest: Effort normal.   Abdominal: Normal appearance.   Genitourinary: Vagina normal.       Pelvic exam was performed with patient supine.   Musculoskeletal:  Normal range of motion.   Neurological: She is alert and oriented to person, place, and time.   Skin: Skin is warm and dry.   Psychiatric: She has a normal mood and affect. Her speech is normal and behavior is normal. Judgment and thought content normal. Cognition and memory are normal.         ED Course   Procedures  Labs Reviewed   URINALYSIS, REFLEX TO URINE CULTURE - Abnormal; Notable for the following components:       Result Value    Protein, UA 2+ (*)     Ketones, UA Trace (*)     All other components within normal limits    Narrative:     Preferred Collection Type->Urine, Clean Catch   URINALYSIS MICROSCOPIC - Abnormal; Notable for the following components:    Hyaline Casts, UA 22 (*)     All other components within normal limits    Narrative:     Preferred Collection Type->Urine, Clean Catch   VAGINAL SCREEN   POCT GLUCOSE   POCT GLUCOSE MONITORING CONTINUOUS          Imaging Results    None          Medical Decision Making:   Initial Assessment:   27 yo female with 5 days of vaginal irritation.   Differential Diagnosis:   Differential Diagnosis includes, but is not limited to:  STI, HSV, BV, PID, TOA, vaginal foreign body, vaginal trauma, ovarian torsion, ovarian cyst, UTI/pyelonephritis, pregnancy complication, dysmenorrhea, mittelschmertz, appendicitis, nephrolithiasis, appendicitis, colitis, diverticulitis,    Clinical Tests:   Lab Tests: Ordered and Reviewed  ED Management:  POCT glucose 101. UA with hyaline casts, patient is on HCTZ. Vaginal screen negative.   Patient likely with vulvovaginitis due to a contact irritant. She will be discharged with instructions to continue using monistat topical cream and follow up with Ob/Gyn if symptoms do not resolve. Patient states understanding and agreement with treatment plan.                       Clinical Impression:   The encounter diagnosis was Vulvovaginitis.                             Destiny Villegas PA-C  02/13/19 0147

## 2020-02-11 ENCOUNTER — HOSPITAL ENCOUNTER (EMERGENCY)
Facility: HOSPITAL | Age: 30
Discharge: HOME | End: 2020-02-12
Attending: EMERGENCY MEDICINE
Payer: COMMERCIAL

## 2020-02-11 DIAGNOSIS — M25.529 ELBOW PAIN: ICD-10-CM

## 2020-02-11 DIAGNOSIS — M77.12 LEFT LATERAL EPICONDYLITIS: Primary | ICD-10-CM

## 2020-02-11 PROCEDURE — 63600175 PHARM REV CODE 636 W HCPCS: Performed by: EMERGENCY MEDICINE

## 2020-02-11 PROCEDURE — 99284 EMERGENCY DEPT VISIT MOD MDM: CPT | Mod: 25

## 2020-02-11 PROCEDURE — 96372 THER/PROPH/DIAG INJ SC/IM: CPT

## 2020-02-11 RX ORDER — KETOROLAC TROMETHAMINE 30 MG/ML
30 INJECTION, SOLUTION INTRAMUSCULAR; INTRAVENOUS
Status: COMPLETED | OUTPATIENT
Start: 2020-02-11 | End: 2020-02-11

## 2020-02-11 RX ADMIN — KETOROLAC TROMETHAMINE 30 MG: 30 INJECTION, SOLUTION INTRAMUSCULAR at 11:02

## 2020-02-12 VITALS
BODY MASS INDEX: 29.35 KG/M2 | OXYGEN SATURATION: 99 % | WEIGHT: 187 LBS | RESPIRATION RATE: 16 BRPM | SYSTOLIC BLOOD PRESSURE: 138 MMHG | HEART RATE: 86 BPM | HEIGHT: 67 IN | TEMPERATURE: 99 F | DIASTOLIC BLOOD PRESSURE: 84 MMHG

## 2020-02-12 LAB
B-HCG UR QL: NEGATIVE
CTP QC/QA: YES

## 2020-02-12 PROCEDURE — 81025 URINE PREGNANCY TEST: CPT | Performed by: EMERGENCY MEDICINE

## 2020-02-12 RX ORDER — MELOXICAM 15 MG/1
15 TABLET ORAL DAILY
Qty: 15 TABLET | Refills: 0 | Status: SHIPPED | OUTPATIENT
Start: 2020-02-12

## 2020-02-12 NOTE — ED NOTES
APPEARANCE: Alert, oriented and in no acute distress.  PERIPHERAL VASCULAR: peripheral pulses present. Normal cap refill. No edema. Warm to touch.    RESPIRATORY:Normal rate and effort, breath sounds clear bilaterally throughout chest. Respirations are equal and unlabored   GASTRO: soft, bowel sounds normal, no tenderness, no abdominal distention.  MUSC: Full ROM. No bony tenderness or soft tissue tenderness. No obvious deformity.  SKIN: Skin is warm and dry, normal skin turgor, mucous membranes moist.  NEURO: 5/5 strength major flexors/extensors bilaterally. Sensory intact to light touch bilaterally. Cinthia coma scale: eyes open spontaneously-4, oriented & converses-5, obeys commands-6. No neurological abnormalities.   MENTAL STATUS: awake, alert and aware of environment.

## 2020-02-12 NOTE — ED TRIAGE NOTES
Pt presents to the ER with c/o Left elbow pain radiating down to her Left arm.  Pt states that it has been hurting since Friday but has increased and she decided to come in.  Denies injury to area; admits to lifting heavy bags of beads for upcoming float.

## 2020-02-12 NOTE — ED PROVIDER NOTES
Encounter Date: 2020    SCRIBE #1 NOTE: I, Christina Perez, am scribing for, and in the presence of,  Dr. Davis. I have scribed the entire note.       History     Chief Complaint   Patient presents with    Arm Pain     Reports lifting bags of float throws 4 days ago and woke up the next day with L elbow pain radiating to L FA. Localized swelling noted to area.      The patient is a 29 y.o female presenting to the ED due to left elbow pain. The patient states that the pain began suddenly on Friday. She notes that the pain is worse with movement and she is unable to make a fist with her left hand. She denies any fall or trauma to the elbow or arm.    The history is provided by the patient.     Review of patient's allergies indicates:  No Known Allergies  Past Medical History:   Diagnosis Date    Diabetes mellitus     Hypertension      Past Surgical History:   Procedure Laterality Date     SECTION, LOW TRANSVERSE       History reviewed. No pertinent family history.  Social History     Tobacco Use    Smoking status: Never Smoker    Smokeless tobacco: Never Used   Substance Use Topics    Alcohol use: Yes     Comment: occasional    Drug use: No     Review of Systems   Constitutional: Negative for activity change, appetite change, chills, diaphoresis and fever.   HENT: Negative for congestion, drooling, ear pain, mouth sores, rhinorrhea, sinus pain, sore throat and trouble swallowing.    Eyes: Negative for pain and discharge.   Respiratory: Negative for cough, chest tightness, shortness of breath, wheezing and stridor.    Cardiovascular: Negative for chest pain, palpitations and leg swelling.   Gastrointestinal: Negative for abdominal distention, abdominal pain, blood in stool, constipation, diarrhea, nausea and vomiting.   Genitourinary: Negative for difficulty urinating, dysuria, flank pain, frequency, hematuria and urgency.   Musculoskeletal: Negative for arthralgias, back pain and myalgias.        +  Left elbow pain   Skin: Negative for pallor, rash and wound.   Neurological: Negative for dizziness, syncope, weakness, light-headedness and numbness.   All other systems reviewed and are negative.      Physical Exam     Initial Vitals [02/11/20 2234]   BP Pulse Resp Temp SpO2   (!) 142/87 73 20 98.7 °F (37.1 °C) 99 %      MAP       --         Physical Exam    Nursing note and vitals reviewed.  Constitutional: She appears well-developed and well-nourished.   HENT:   Head: Normocephalic and atraumatic.   Right Ear: External ear normal.   Left Ear: External ear normal.   Nose: Nose normal.   Mouth/Throat: Oropharynx is clear and moist.   Eyes: Conjunctivae and EOM are normal. Pupils are equal, round, and reactive to light. No scleral icterus.   Neck: Normal range of motion. Neck supple. No JVD present.   Cardiovascular: Normal rate, regular rhythm, normal heart sounds and intact distal pulses. Exam reveals no gallop and no friction rub.    No murmur heard.  Pulmonary/Chest: Breath sounds normal. No stridor. No respiratory distress. She has no wheezes. She exhibits no tenderness.   Abdominal: Soft. Bowel sounds are normal. She exhibits no distension and no mass. There is no tenderness. There is no rebound and no guarding.   Musculoskeletal: Normal range of motion. She exhibits tenderness (Lateral epicondyle of left elbow). She exhibits no edema.   Full ROM but pain with ROM to left elbow   Neurological: She is alert and oriented to person, place, and time. She has normal strength. No cranial nerve deficit.   Skin: Skin is warm and dry. Capillary refill takes less than 2 seconds. No rash noted. No pallor.   Psychiatric: She has a normal mood and affect. Thought content normal.         ED Course   Procedures  Labs Reviewed   POCT URINE PREGNANCY          Imaging Results          X-Ray Elbow Complete Left (Final result)  Result time 02/12/20 00:34:55    Final result by Shira Trivedi MD (02/12/20 00:34:55)                  Impression:      No acute osseous abnormality identified.      Electronically signed by: Shira Trivedi MD  Date:    02/12/2020  Time:    00:34             Narrative:    EXAMINATION:  XR ELBOW COMPLETE 3 VIEW LEFT    CLINICAL HISTORY:  Pain in unspecified elbow    TECHNIQUE:  AP, lateral, and oblique views of the left elbow were performed.    COMPARISON:  None    FINDINGS:  No evidence of acute displaced fracture, dislocation, or osseous destructive process.  No significant elbow joint effusion.                                                                 Clinical Impression:     1. Left lateral epicondylitis    2. Elbow pain                 I, Bahvya Davis, personally performed the services described in this documentation. All medical record entries made by the scribe were at my direction and in my presence.  I have reviewed the chart and agree that the record reflects my personal performance and is accurate and complete. Bhavya Davis M.D. 12:50 AM02/12/2020               Bhavya Davis MD  02/12/20 0050

## 2020-08-25 ENCOUNTER — HOSPITAL ENCOUNTER (EMERGENCY)
Facility: HOSPITAL | Age: 30
Discharge: HOME | End: 2020-08-25
Attending: EMERGENCY MEDICINE
Payer: COMMERCIAL

## 2020-08-25 VITALS
HEIGHT: 68 IN | HEART RATE: 80 BPM | SYSTOLIC BLOOD PRESSURE: 174 MMHG | DIASTOLIC BLOOD PRESSURE: 106 MMHG | WEIGHT: 158.75 LBS | RESPIRATION RATE: 16 BRPM | BODY MASS INDEX: 24.06 KG/M2 | TEMPERATURE: 98 F | OXYGEN SATURATION: 100 %

## 2020-08-25 DIAGNOSIS — R10.13 EPIGASTRIC ABDOMINAL PAIN: Primary | ICD-10-CM

## 2020-08-25 DIAGNOSIS — R11.2 NAUSEA & VOMITING: ICD-10-CM

## 2020-08-25 LAB
ALBUMIN SERPL BCP-MCNC: 4.1 G/DL (ref 3.5–5.2)
ALP SERPL-CCNC: 112 U/L (ref 55–135)
ALT SERPL W/O P-5'-P-CCNC: 12 U/L (ref 10–44)
ANION GAP SERPL CALC-SCNC: 12 MMOL/L (ref 8–16)
AST SERPL-CCNC: 21 U/L (ref 10–40)
B-HCG UR QL: NEGATIVE
BASOPHILS # BLD AUTO: 0.04 K/UL (ref 0–0.2)
BASOPHILS NFR BLD: 0.5 % (ref 0–1.9)
BILIRUB SERPL-MCNC: 0.9 MG/DL (ref 0.1–1)
BILIRUB UR QL STRIP: NEGATIVE
BUN SERPL-MCNC: 6 MG/DL (ref 6–20)
CALCIUM SERPL-MCNC: 9.7 MG/DL (ref 8.7–10.5)
CHLORIDE SERPL-SCNC: 107 MMOL/L (ref 95–110)
CLARITY UR: CLEAR
CO2 SERPL-SCNC: 22 MMOL/L (ref 23–29)
COLOR UR: YELLOW
CREAT SERPL-MCNC: 0.7 MG/DL (ref 0.5–1.4)
CTP QC/QA: YES
DIFFERENTIAL METHOD: ABNORMAL
EOSINOPHIL # BLD AUTO: 0 K/UL (ref 0–0.5)
EOSINOPHIL NFR BLD: 0.1 % (ref 0–8)
ERYTHROCYTE [DISTWIDTH] IN BLOOD BY AUTOMATED COUNT: 14.7 % (ref 11.5–14.5)
EST. GFR  (AFRICAN AMERICAN): >60 ML/MIN/1.73 M^2
EST. GFR  (NON AFRICAN AMERICAN): >60 ML/MIN/1.73 M^2
GLUCOSE SERPL-MCNC: 113 MG/DL (ref 70–110)
GLUCOSE UR QL STRIP: NEGATIVE
HCT VFR BLD AUTO: 37.2 % (ref 37–48.5)
HGB BLD-MCNC: 11.8 G/DL (ref 12–16)
HGB UR QL STRIP: NEGATIVE
IMM GRANULOCYTES # BLD AUTO: 0.01 K/UL (ref 0–0.04)
IMM GRANULOCYTES NFR BLD AUTO: 0.1 % (ref 0–0.5)
KETONES UR QL STRIP: ABNORMAL
LEUKOCYTE ESTERASE UR QL STRIP: NEGATIVE
LIPASE SERPL-CCNC: 22 U/L (ref 4–60)
LYMPHOCYTES # BLD AUTO: 2 K/UL (ref 1–4.8)
LYMPHOCYTES NFR BLD: 24.7 % (ref 18–48)
MCH RBC QN AUTO: 28.2 PG (ref 27–31)
MCHC RBC AUTO-ENTMCNC: 31.7 G/DL (ref 32–36)
MCV RBC AUTO: 89 FL (ref 82–98)
MONOCYTES # BLD AUTO: 0.3 K/UL (ref 0.3–1)
MONOCYTES NFR BLD: 3.7 % (ref 4–15)
NEUTROPHILS # BLD AUTO: 5.6 K/UL (ref 1.8–7.7)
NEUTROPHILS NFR BLD: 70.9 % (ref 38–73)
NITRITE UR QL STRIP: NEGATIVE
NRBC BLD-RTO: 0 /100 WBC
PH UR STRIP: 7 [PH] (ref 5–8)
PLATELET # BLD AUTO: 396 K/UL (ref 150–350)
PMV BLD AUTO: 9.8 FL (ref 9.2–12.9)
POTASSIUM SERPL-SCNC: 4 MMOL/L (ref 3.5–5.1)
PROT SERPL-MCNC: 7.8 G/DL (ref 6–8.4)
PROT UR QL STRIP: ABNORMAL
RBC # BLD AUTO: 4.19 M/UL (ref 4–5.4)
SODIUM SERPL-SCNC: 141 MMOL/L (ref 136–145)
SP GR UR STRIP: 1.02 (ref 1–1.03)
URN SPEC COLLECT METH UR: ABNORMAL
UROBILINOGEN UR STRIP-ACNC: NEGATIVE EU/DL
WBC # BLD AUTO: 7.92 K/UL (ref 3.9–12.7)

## 2020-08-25 PROCEDURE — 25500020 PHARM REV CODE 255: Performed by: EMERGENCY MEDICINE

## 2020-08-25 PROCEDURE — 63600175 PHARM REV CODE 636 W HCPCS: Performed by: EMERGENCY MEDICINE

## 2020-08-25 PROCEDURE — 81003 URINALYSIS AUTO W/O SCOPE: CPT

## 2020-08-25 PROCEDURE — 63600175 PHARM REV CODE 636 W HCPCS: Performed by: NURSE PRACTITIONER

## 2020-08-25 PROCEDURE — 81025 URINE PREGNANCY TEST: CPT | Performed by: NURSE PRACTITIONER

## 2020-08-25 PROCEDURE — 96374 THER/PROPH/DIAG INJ IV PUSH: CPT | Mod: 59

## 2020-08-25 PROCEDURE — 96375 TX/PRO/DX INJ NEW DRUG ADDON: CPT

## 2020-08-25 PROCEDURE — 25000003 PHARM REV CODE 250: Performed by: EMERGENCY MEDICINE

## 2020-08-25 PROCEDURE — 93010 EKG 12-LEAD: ICD-10-PCS | Mod: ,,, | Performed by: INTERNAL MEDICINE

## 2020-08-25 PROCEDURE — 93005 ELECTROCARDIOGRAM TRACING: CPT

## 2020-08-25 PROCEDURE — 99285 EMERGENCY DEPT VISIT HI MDM: CPT | Mod: 25

## 2020-08-25 PROCEDURE — 93010 ELECTROCARDIOGRAM REPORT: CPT | Mod: ,,, | Performed by: INTERNAL MEDICINE

## 2020-08-25 PROCEDURE — 80053 COMPREHEN METABOLIC PANEL: CPT

## 2020-08-25 PROCEDURE — 85025 COMPLETE CBC W/AUTO DIFF WBC: CPT

## 2020-08-25 PROCEDURE — 83690 ASSAY OF LIPASE: CPT

## 2020-08-25 PROCEDURE — 96361 HYDRATE IV INFUSION ADD-ON: CPT

## 2020-08-25 RX ORDER — METOCLOPRAMIDE 10 MG/1
10 TABLET ORAL EVERY 6 HOURS
Qty: 12 TABLET | Refills: 0 | Status: SHIPPED | OUTPATIENT
Start: 2020-08-25 | End: 2020-08-28

## 2020-08-25 RX ORDER — PROMETHAZINE HYDROCHLORIDE 25 MG/1
25 SUPPOSITORY RECTAL EVERY 6 HOURS PRN
Qty: 4 SUPPOSITORY | Refills: 0 | Status: SHIPPED | OUTPATIENT
Start: 2020-08-25 | End: 2020-08-28

## 2020-08-25 RX ORDER — METOCLOPRAMIDE HYDROCHLORIDE 5 MG/ML
10 INJECTION INTRAMUSCULAR; INTRAVENOUS
Status: DISCONTINUED | OUTPATIENT
Start: 2020-08-25 | End: 2020-08-26 | Stop reason: HOSPADM

## 2020-08-25 RX ORDER — ONDANSETRON 2 MG/ML
4 INJECTION INTRAMUSCULAR; INTRAVENOUS
Status: COMPLETED | OUTPATIENT
Start: 2020-08-25 | End: 2020-08-25

## 2020-08-25 RX ORDER — SODIUM CHLORIDE 9 MG/ML
500 INJECTION, SOLUTION INTRAVENOUS
Status: COMPLETED | OUTPATIENT
Start: 2020-08-25 | End: 2020-08-25

## 2020-08-25 RX ORDER — MORPHINE SULFATE 4 MG/ML
4 INJECTION, SOLUTION INTRAMUSCULAR; INTRAVENOUS
Status: COMPLETED | OUTPATIENT
Start: 2020-08-25 | End: 2020-08-25

## 2020-08-25 RX ADMIN — SODIUM CHLORIDE 500 ML: 0.9 INJECTION, SOLUTION INTRAVENOUS at 09:08

## 2020-08-25 RX ADMIN — IOHEXOL 75 ML: 350 INJECTION, SOLUTION INTRAVENOUS at 10:08

## 2020-08-25 RX ADMIN — MORPHINE SULFATE 4 MG: 4 INJECTION INTRAVENOUS at 10:08

## 2020-08-25 RX ADMIN — ONDANSETRON 4 MG: 2 INJECTION INTRAMUSCULAR; INTRAVENOUS at 09:08

## 2020-08-26 NOTE — ED NOTES
Review of patient's allergies indicates:  No Known Allergies     Patient has verified the spelling of their name and  on armband.   APPEARANCE: Patient is alert, oriented x 4  SKIN: Skin is normal for race, warm, and dry. Normal skin turgor and mucous membranes moist.  CARDIAC: Normal rate and rhythm, no murmur heard.   RESPIRATORY:Normal rate and effort. Breath sounds clear bilaterally throughout chest. Respirations are equal and unlabored.  No SOB or cough reported at this time.   GASTRO: Bowel sounds normal, abdomen is soft and no abdominal distention. Pt complains of left sided abdomen tenderness. Pt reports nausea and is actively dry heaving during assessment. Pt denies any diarrhea.  MUSCLE: Full ROM. No bony tenderness or soft tissue tenderness. No obvious deformity.  PERIPHERAL VASCULAR: peripheral pulses present. Normal cap refill. No edema. Warm to touch.  NEURO: 5/5 strength major flexors/extensors bilaterally. Sensory intact to light touch bilaterally. Alpine coma scale: eyes open spontaneously-4, oriented & converses-5, obeys commands-6. No neurological abnormalities.   MENTAL STATUS: awake, alert and aware of environment.  : Voids without complication

## 2020-08-26 NOTE — DISCHARGE INSTRUCTIONS
Begin a bland diet today, including bread/rice/bananas/oatmeal; avoid heavy/greasy/fatty foods for the next 24 hours.   Drink plenty of fluids to stay hydrated.  You may take nausea medication as prescribed.   Follow-up with your primary care provider as soon as possible.  Return to the ED for severe nausea/vomiting and dehydration, high fever, severe abdominal pain, or any other concerns.

## 2020-08-26 NOTE — FIRST PROVIDER EVALUATION
Emergency Department TeleTRIAGE Encounter Note      CHIEF COMPLAINT    Chief Complaint   Patient presents with    Abdominal Pain     Abdominal pain, nausea and vomiting since Sunday.  Last BM was Sunday.       VITAL SIGNS   Initial Vitals [08/25/20 2103]   BP Pulse Resp Temp SpO2   (!) 183/103 80 18 98.4 °F (36.9 °C) --      MAP       --            ALLERGIES    Review of patient's allergies indicates:  No Known Allergies    PROVIDER TRIAGE NOTE  This is a teletriage evaluation of a 29 y.o. female presenting to the ED with c/o upper abdominal pain with multiple episodes of vomiting.  Patient appears uncomfortable. Initial orders will be placed and care will be transferred to an alternate provider when patient is roomed for a full evaluation. Any additional orders and the final disposition will be determined by that provider.         ORDERS  Labs Reviewed - No data to display    ED Orders (720h ago, onward)    Start Ordered     Status Ordering Provider    08/25/20 2115 08/25/20 2107  ondansetron injection 4 mg  ED 1 Time      Ordered ANETTE TONY    08/25/20 2107 08/25/20 2107  Diet NPO  Diet effective now      Ordered ANETTE TONY    Unscheduled 08/25/20 2107  Vital signs  Every 2 hours      Ordered ANETTE TONY    Unscheduled 08/25/20 2107  Insert peripheral IV  Once      Ordered ANETTE TONY    Unscheduled 08/25/20 2107  CBC W/ AUTO DIFFERENTIAL  STAT      Ordered ANETTE TONY    Unscheduled 08/25/20 2107  Comp. Metabolic Panel  STAT      Ordered ANETTE TONY    Unscheduled 08/25/20 2107  Lipase  STAT      Ordered ANETTE TONY    Unscheduled 08/25/20 2107  Urinalysis, Reflex to Urine Culture Urine, Clean Catch  STAT      Ordered ANETTE TONY    Unscheduled 08/25/20 2107  POCT urine pregnancy  Once      Ordered ANETTE TONY            Virtual Visit Note: The provider triage portion of this emergency department evaluation and documentation was performed via [employer], a  HIPAA-compliant telemedicine application, in concert with a tele-presenter in the room. A face to face patient evaluation with one of my colleagues will occur once the patient is placed in an emergency department room.      DISCLAIMER: This note was prepared with [employer] voice recognition transcription software. Garbled syntax, mangled pronouns, and other bizarre constructions may be attributed to that software system.

## 2020-08-26 NOTE — ED PROVIDER NOTES
Encounter Date: 2020    SCRIBE #1 NOTE: I, Jessica Pike , am scribing for, and in the presence of,  Dr. Pacheco . I have scribed the entire note.       History     Chief Complaint   Patient presents with    Abdominal Pain     Abdominal pain, nausea and vomiting since .  Last BM was .     Christa Falk is a 29 y.o. female who  has a past medical history of Diabetes mellitus and Hypertension.    The patient presents to the ED due to abdominal pain, ongoing since  after eating seafood.  She endorses associated nausea, vomiting, and chills. She has tried taking Zofran without improvement.  Patient denies any fever, diarrhea, CP, SOB, blood in emesis/stool, or any other associated complaints.   She has history of gastric bypass 2019 at [facility]. No other prior surgeries.   Her LMP was earlier this month.     The history is provided by the patient.     Review of patient's allergies indicates:  No Known Allergies  Past Medical History:   Diagnosis Date    Diabetes mellitus     Hypertension      Past Surgical History:   Procedure Laterality Date     SECTION, LOW TRANSVERSE       No family history on file.  Social History     Tobacco Use    Smoking status: Never Smoker    Smokeless tobacco: Never Used   Substance Use Topics    Alcohol use: Yes     Comment: occasional    Drug use: No     Review of Systems   Constitutional: Positive for appetite change and chills. Negative for activity change, diaphoresis and fever.   HENT: Negative for sore throat.    Respiratory: Negative for cough and shortness of breath.    Cardiovascular: Negative for chest pain.   Gastrointestinal: Positive for abdominal pain, nausea and vomiting. Negative for abdominal distention, blood in stool, constipation and diarrhea.   Genitourinary: Negative for dysuria, frequency and urgency.   Musculoskeletal: Negative for back pain.   Skin: Negative for rash and wound.   Neurological: Negative for  syncope and weakness.   Hematological: Does not bruise/bleed easily.   Psychiatric/Behavioral: Negative for agitation, behavioral problems and confusion.       Physical Exam     Initial Vitals   BP Pulse Resp Temp SpO2   08/25/20 2103 08/25/20 2103 08/25/20 2103 08/25/20 2103 08/25/20 2151   (!) 183/103 80 18 98.4 °F (36.9 °C) 100 %      MAP       --                Physical Exam    Nursing note and vitals reviewed.  Constitutional: She appears well-developed and well-nourished. She is not diaphoretic. She appears distressed.   Patient appears distressed and uncomfortable.  Actively vomiting.   HENT:   Head: Normocephalic and atraumatic.   Mouth/Throat: Oropharynx is clear and moist.   Eyes: EOM are normal. Pupils are equal, round, and reactive to light.   Neck: No tracheal deviation present.   Cardiovascular: Normal rate, regular rhythm, normal heart sounds and intact distal pulses.   Pulmonary/Chest: Breath sounds normal. No stridor. No respiratory distress. She has no wheezes.   Abdominal: Soft. Normal appearance and bowel sounds are normal. She exhibits no distension. There is no hepatosplenomegaly. There is abdominal tenderness (mild) in the epigastric area. There is no rigidity, no rebound and no guarding.   Musculoskeletal: Normal range of motion. No edema.   Neurological: She is alert and oriented to person, place, and time. She has normal strength. No cranial nerve deficit or sensory deficit.   Skin: Skin is warm and dry. Capillary refill takes less than 2 seconds. No rash noted. No pallor.   Multiple tattoos.   Psychiatric: She has a normal mood and affect. Her behavior is normal. Thought content normal.         ED Course   Procedures  Labs Reviewed   CBC W/ AUTO DIFFERENTIAL - Abnormal; Notable for the following components:       Result Value    Hemoglobin 11.8 (*)     Mean Corpuscular Hemoglobin Conc 31.7 (*)     RDW 14.7 (*)     Platelets 396 (*)     Mono% 3.7 (*)     All other components within normal  limits   COMPREHENSIVE METABOLIC PANEL - Abnormal; Notable for the following components:    CO2 22 (*)     Glucose 113 (*)     All other components within normal limits   URINALYSIS, REFLEX TO URINE CULTURE - Abnormal; Notable for the following components:    Protein, UA Trace (*)     Ketones, UA 2+ (*)     All other components within normal limits    Narrative:     Specimen Source->Urine   LIPASE   POCT URINE PREGNANCY     EKG Readings: (Independently Interpreted)   Initial Reading: No STEMI. Previous EKG: Compared with most recent EKG Rhythm: Normal Sinus Rhythm.   Normal sinus rhythm, rate 77, no ST changes or ischemia, normal intervals.  No prior for comparison.       Imaging Results          CT Abdomen Pelvis With Contrast (Final result)  Result time 08/25/20 22:54:53    Final result by Andrew Contreras MD (08/25/20 22:54:53)                 Impression:      1. No definite acute finding in the abdomen or pelvis identified to account for the patient's symptoms.  2. Simple appearing left ovarian cyst measures up to approximately 5 cm.  This finding is felt to be incidental in a patient of this age in the absence of symptoms referable to this location.  If indicated, pelvic ultrasound could be performed for further evaluation.      Electronically signed by: Andrew Contreras  Date:    08/25/2020  Time:    22:54             Narrative:    EXAMINATION:  CT ABDOMEN PELVIS WITH CONTRAST    CLINICAL HISTORY:  Abdominal infection suspected;    TECHNIQUE:  Low dose axial images, sagittal and coronal reformations were obtained from the lung bases to the pubic symphysis following the IV administration of 75 mL of Omnipaque 350    COMPARISON:  Pelvic ultrasound performed 10/30/2017    FINDINGS:  Abdomen:    - Lower thorax:Unremarkable    - Lung bases: No infiltrates and no nodules.    - Liver: No focal mass.  Focal fatty deposition is suggested in the region the falciform ligament.    - Gallbladder: No calcified  gallstones.    - Bile Ducts: No evidence of intra or extra hepatic biliary ductal dilation.    - Spleen: Negative.    - Kidneys: Subcentimeter hypodense lesion left inferior pole too small to definitively characterize but favored to represent benign cyst.    - Adrenals: Unremarkable.    - Pancreas: No mass or peripancreatic fat stranding.    - Retroperitoneum:  No significant adenopathy.    - Vascular: No abdominal aortic aneurysm.    - Abdominal wall:  Unremarkable.    Pelvis:    Small amount of simple appearing free fluid is noted in the pelvic cul-de-sac.  There is cystic lesion in the left adnexa which measures approximately 4.5 x 4.1 by 5 cm (anterior-posterior, mediolateral, craniocaudal).    Bowel/Mesentery:    Status post gastric bypass.  A moderate amount of stool is noted in the distal colon and rectum.  The appendix is not definitively visualized.    Bones:  No acute osseous abnormality and no suspicious lytic or blastic lesion.  Mild height loss of the T12 superior endplate also be chronic, possibly sequela of prior trauma versus developmental etiology.                                 Medical Decision Making:   History:   Old Medical Records: I decided to obtain old medical records.  Old Records Summarized: other records.  Differential Diagnosis:   Differential Diagnosis includes, but is not limited to:  Bowel obstruction, incarcerated/strangulated hernia, ileus, appendicitis, cholecystitis, aspirated foreign body, esophageal food impaction, biliary colic, colitis/diverticulitis, gastroenteritis, esophagitis, hepatitis, pancreatitis, GERD, PUD, constipation, nephrolithiasis, UTI/pyelonephritis.    Independently Interpreted Test(s):   I have ordered and independently interpreted X-rays - see prior notes.  Clinical Tests:   Lab Tests: Ordered and Reviewed  Radiological Study: Ordered and Reviewed  Medical Tests: Ordered and Reviewed    On re-evaluation, the patient's status has improved.  After complete ED  evaluation, clinical impression is most consistent with N/V, abdominal pain.  PCP follow-up within 2-3 days was recommended.    After taking into careful account the patient's history, physical exam findings, as well as empirical and objective data obtained throughout ED workup, I feel no emergent medical condition has been identified. No further evaluation or admission was felt to be required, and the patient is stable for discharge from the ED. The patient and any additional family present were updated with test results, overall clinical impression, and recommended further plan of care, including discharge instructions as provided and outpatient follow-up for continued evaluation and management as needed. All questions were answered. The patient expressed understanding and agreed with current plan for discharge and follow-up plan of care. Strict ED return precautions were provided, including return/worsening of current symptoms, new symptoms, or any other concerns.                     ED Course as of Aug 25 2337   Tue Aug 25, 2020   2131 29-year-old female with history of gastric bypass surgery November 2019 at [facility] presents to ER due to nausea and vomiting that started on Sunday after eating seafood.  Denies fever, diarrhea, or bloody stool.  Vitals unremarkable, afebrile.  Exam with epigastric abdominal tenderness and active vomiting on arrival.  Will obtain labs, CT A/P, treat symptomatically, and continue to monitor.    [SS]   2304 Labs unremarkable.  UA without infection.  CT reveals no acute intra-abdominal process.  Incidental left ovarian cyst.  On reassessment, patient comfortably asleep.  On reexamination, no significant abdominal tenderness, no distension, no rebound or guarding.  No lower abdominal pain or tenderness.  Ovarian torsion unlikely.  No indication for further imaging at this time.  Will p.o. challenge and reassess.    [SS]   6267 Informed by nurse that patient tolerated p.o. ice  chips and water without further vomiting.  Patient was informed of findings and reassured.  Counseled on symptomatic and supportive care, bland diet, p.o. fluids, nausea medication as needed.  Follow up with PCP as soon as possible or return to ER for worsening symptoms or any other concerns.    [SS]      ED Course User Index  [SS] Roberth Arvizu MD                Clinical Impression:     1. Epigastric abdominal pain    2. Nausea & vomiting               ED Disposition Condition    Discharge Stable        ED Prescriptions     Medication Sig Dispense Start Date End Date Auth. Provider    promethazine (PHENERGAN) 25 MG suppository Place 1 suppository (25 mg total) rectally every 6 (six) hours as needed for Nausea (vomiting). 4 suppository 8/25/2020 8/28/2020 Roberth Arvizu MD    metoclopramide HCl (REGLAN) 10 MG tablet Take 1 tablet (10 mg total) by mouth every 6 (six) hours. for 3 days 12 tablet 8/25/2020 8/28/2020 Roberth Arvizu MD        Follow-up Information     Follow up With Specialties Details Why Contact Info    Nohelia Stafford MD Internal Medicine Schedule an appointment as soon as possible for a visit   [address]  [address]  [facility] [address]  [phone]      [facility] Emergency Medicine  As needed, If symptoms worsen [address] [address] [phone]                            I, Dr. Roberth Arvizu, personally performed the services described in this documentation. All medical record entries made by the scribe were at my direction and in my presence.  I have reviewed the chart and agree that the record reflects my personal performance and is accurate and complete.     Roberth Arvizu MD.             Roberth Arvizu MD  08/25/20 5555

## 2020-11-15 ENCOUNTER — HOSPITAL ENCOUNTER (EMERGENCY)
Facility: HOSPITAL | Age: 30
Discharge: HOME | End: 2020-11-15
Attending: EMERGENCY MEDICINE
Payer: COMMERCIAL

## 2020-11-15 VITALS
SYSTOLIC BLOOD PRESSURE: 148 MMHG | TEMPERATURE: 97 F | HEIGHT: 68 IN | RESPIRATION RATE: 16 BRPM | WEIGHT: 183 LBS | DIASTOLIC BLOOD PRESSURE: 107 MMHG | BODY MASS INDEX: 27.74 KG/M2 | HEART RATE: 86 BPM | OXYGEN SATURATION: 98 %

## 2020-11-15 DIAGNOSIS — R10.9 ABDOMINAL PAIN, UNSPECIFIED ABDOMINAL LOCATION: ICD-10-CM

## 2020-11-15 DIAGNOSIS — R11.2 NON-INTRACTABLE VOMITING WITH NAUSEA, UNSPECIFIED VOMITING TYPE: Primary | ICD-10-CM

## 2020-11-15 LAB
ALBUMIN SERPL BCP-MCNC: 3.7 G/DL (ref 3.5–5.2)
ALP SERPL-CCNC: 98 U/L (ref 55–135)
ALT SERPL W/O P-5'-P-CCNC: 11 U/L (ref 10–44)
ANION GAP SERPL CALC-SCNC: 14 MMOL/L (ref 8–16)
AST SERPL-CCNC: 15 U/L (ref 10–40)
B-HCG UR QL: NEGATIVE
BACTERIA #/AREA URNS AUTO: ABNORMAL /HPF
BASOPHILS # BLD AUTO: 0.02 K/UL (ref 0–0.2)
BASOPHILS NFR BLD: 0.3 % (ref 0–1.9)
BILIRUB SERPL-MCNC: 1.1 MG/DL (ref 0.1–1)
BILIRUB UR QL STRIP: NEGATIVE
BUN SERPL-MCNC: 13 MG/DL (ref 6–20)
CALCIUM SERPL-MCNC: 9 MG/DL (ref 8.7–10.5)
CHLORIDE SERPL-SCNC: 103 MMOL/L (ref 95–110)
CLARITY UR REFRACT.AUTO: ABNORMAL
CO2 SERPL-SCNC: 20 MMOL/L (ref 23–29)
COLOR UR AUTO: YELLOW
CREAT SERPL-MCNC: 0.7 MG/DL (ref 0.5–1.4)
CTP QC/QA: YES
DIFFERENTIAL METHOD: ABNORMAL
EOSINOPHIL # BLD AUTO: 0 K/UL (ref 0–0.5)
EOSINOPHIL NFR BLD: 0 % (ref 0–8)
ERYTHROCYTE [DISTWIDTH] IN BLOOD BY AUTOMATED COUNT: 15.6 % (ref 11.5–14.5)
EST. GFR  (AFRICAN AMERICAN): >60 ML/MIN/1.73 M^2
EST. GFR  (NON AFRICAN AMERICAN): >60 ML/MIN/1.73 M^2
GLUCOSE SERPL-MCNC: 95 MG/DL (ref 70–110)
GLUCOSE UR QL STRIP: NEGATIVE
HCT VFR BLD AUTO: 39.2 % (ref 37–48.5)
HGB BLD-MCNC: 12.1 G/DL (ref 12–16)
HGB UR QL STRIP: NEGATIVE
HYALINE CASTS UR QL AUTO: 0 /LPF
IMM GRANULOCYTES # BLD AUTO: 0.01 K/UL (ref 0–0.04)
IMM GRANULOCYTES NFR BLD AUTO: 0.2 % (ref 0–0.5)
KETONES UR QL STRIP: ABNORMAL
LEUKOCYTE ESTERASE UR QL STRIP: NEGATIVE
LIPASE SERPL-CCNC: 31 U/L (ref 4–60)
LYMPHOCYTES # BLD AUTO: 1.9 K/UL (ref 1–4.8)
LYMPHOCYTES NFR BLD: 31.5 % (ref 18–48)
MCH RBC QN AUTO: 27.7 PG (ref 27–31)
MCHC RBC AUTO-ENTMCNC: 30.9 G/DL (ref 32–36)
MCV RBC AUTO: 90 FL (ref 82–98)
MICROSCOPIC COMMENT: ABNORMAL
MONOCYTES # BLD AUTO: 0.5 K/UL (ref 0.3–1)
MONOCYTES NFR BLD: 8.1 % (ref 4–15)
NEUTROPHILS # BLD AUTO: 3.5 K/UL (ref 1.8–7.7)
NEUTROPHILS NFR BLD: 59.9 % (ref 38–73)
NITRITE UR QL STRIP: NEGATIVE
NRBC BLD-RTO: 0 /100 WBC
PH UR STRIP: 5 [PH] (ref 5–8)
PLATELET # BLD AUTO: 426 K/UL (ref 150–350)
PMV BLD AUTO: 9.6 FL (ref 9.2–12.9)
POTASSIUM SERPL-SCNC: 3.5 MMOL/L (ref 3.5–5.1)
PROT SERPL-MCNC: 7.1 G/DL (ref 6–8.4)
PROT UR QL STRIP: ABNORMAL
RBC # BLD AUTO: 4.37 M/UL (ref 4–5.4)
RBC #/AREA URNS AUTO: 1 /HPF (ref 0–4)
SODIUM SERPL-SCNC: 137 MMOL/L (ref 136–145)
SP GR UR STRIP: >=1.03 (ref 1–1.03)
SQUAMOUS #/AREA URNS AUTO: 5 /HPF
URN SPEC COLLECT METH UR: ABNORMAL
WBC # BLD AUTO: 5.9 K/UL (ref 3.9–12.7)
WBC #/AREA URNS AUTO: 6 /HPF (ref 0–5)

## 2020-11-15 PROCEDURE — 96374 THER/PROPH/DIAG INJ IV PUSH: CPT

## 2020-11-15 PROCEDURE — 96361 HYDRATE IV INFUSION ADD-ON: CPT

## 2020-11-15 PROCEDURE — 80053 COMPREHEN METABOLIC PANEL: CPT

## 2020-11-15 PROCEDURE — 25000003 PHARM REV CODE 250: Performed by: PHYSICIAN ASSISTANT

## 2020-11-15 PROCEDURE — 81001 URINALYSIS AUTO W/SCOPE: CPT

## 2020-11-15 PROCEDURE — 96375 TX/PRO/DX INJ NEW DRUG ADDON: CPT

## 2020-11-15 PROCEDURE — 99284 EMERGENCY DEPT VISIT MOD MDM: CPT | Mod: 25

## 2020-11-15 PROCEDURE — 85025 COMPLETE CBC W/AUTO DIFF WBC: CPT

## 2020-11-15 PROCEDURE — 63600175 PHARM REV CODE 636 W HCPCS: Performed by: PHYSICIAN ASSISTANT

## 2020-11-15 PROCEDURE — 99284 EMERGENCY DEPT VISIT MOD MDM: CPT | Mod: ,,, | Performed by: PHYSICIAN ASSISTANT

## 2020-11-15 PROCEDURE — 99284 PR EMERGENCY DEPT VISIT,LEVEL IV: ICD-10-PCS | Mod: ,,, | Performed by: PHYSICIAN ASSISTANT

## 2020-11-15 PROCEDURE — 81025 URINE PREGNANCY TEST: CPT | Performed by: PHYSICIAN ASSISTANT

## 2020-11-15 PROCEDURE — 83690 ASSAY OF LIPASE: CPT

## 2020-11-15 RX ORDER — MAG HYDROX/ALUMINUM HYD/SIMETH 200-200-20
5 SUSPENSION, ORAL (FINAL DOSE FORM) ORAL
Status: COMPLETED | OUTPATIENT
Start: 2020-11-15 | End: 2020-11-15

## 2020-11-15 RX ORDER — HALOPERIDOL 5 MG/ML
5 INJECTION INTRAMUSCULAR
Status: COMPLETED | OUTPATIENT
Start: 2020-11-15 | End: 2020-11-15

## 2020-11-15 RX ORDER — ONDANSETRON 4 MG/1
4 TABLET, FILM COATED ORAL EVERY 6 HOURS PRN
Qty: 12 TABLET | Refills: 0 | OUTPATIENT
Start: 2020-11-15 | End: 2023-11-03

## 2020-11-15 RX ORDER — ONDANSETRON 2 MG/ML
4 INJECTION INTRAMUSCULAR; INTRAVENOUS
Status: COMPLETED | OUTPATIENT
Start: 2020-11-15 | End: 2020-11-15

## 2020-11-15 RX ADMIN — SODIUM CHLORIDE 1000 ML: 0.9 INJECTION, SOLUTION INTRAVENOUS at 10:11

## 2020-11-15 RX ADMIN — HALOPERIDOL LACTATE 5 MG: 5 INJECTION, SOLUTION INTRAMUSCULAR at 10:11

## 2020-11-15 RX ADMIN — ONDANSETRON 4 MG: 2 INJECTION INTRAMUSCULAR; INTRAVENOUS at 10:11

## 2020-11-15 RX ADMIN — ALUMINUM HYDROXIDE, MAGNESIUM HYDROXIDE, AND SIMETHICONE 5 ML: 200; 200; 20 SUSPENSION ORAL at 10:11

## 2020-11-15 NOTE — ED TRIAGE NOTES
Christa Falk, a 30 y.o. female presents to the ED w/ complaint of abdominal pain with NV, denies diarrhea fever or chills     Triage note:  Chief Complaint   Patient presents with    Abdominal Pain     x 3 days; history of gastric bypass    Vomiting     Review of patient's allergies indicates:  No Known Allergies  Past Medical History:   Diagnosis Date    Diabetes mellitus     Hypertension      LOC: Patient name and date of birth verified. The patient is awake, alert and aware of environment with an appropriate affect, the patient is oriented x 3 and speaking appropriately.   APPEARANCE: Patient resting comfortably, patient is clean and well groomed, patient's clothing is properly fastened.  SKIN: The skin is warm and dry, color consistent with ethnicity, patient has normal skin turgor and moist mucus membranes, skin intact, no breakdown or bruising noted.  MUSCULOSKELETAL: Patient moving all extremities well, no obvious swelling or deformities noted.   RESPIRATORY: Respirations are spontaneous, patient has a normal effort and rate, no accessory muscle use noted.  CARDIAC: Patient has a normal rate and rhythm, no periphreal edema noted, capillary refill < 3 seconds.  NEUROLOGIC: Eyes open spontaneously, behavior appropriate to situation, follows commands, facial expression symmetrical, bilateral hand grasp equal and even, purposeful motor response noted, normal sensation in all extremities when touched with a finger.

## 2020-11-15 NOTE — ED NOTES
PA at bedside, explains test results and dc instructions and of plan of care to pt- states understands.

## 2020-11-15 NOTE — DISCHARGE INSTRUCTIONS
Follow up closely with her bariatric surgeon or primary care physician.  Take Zofran every 6 hr as needed for your nausea.  Take acetaminophen/Tylenol 650 mg every 6 hr for pain relief.  Rest.  Start soft/fluid diet and advanced to solids as tolerated.  Return to the emergency department for new or worsening symptoms.  No future appointments.  Our goal in the emergency department is to always give you outstanding care and exceptional service. You may receive a survey by mail or e-mail in the next week regarding your experience in our ED. We would greatly appreciate your completing and returning the survey. Your feedback provides us with a way to recognize our staff who give very good care and it helps us learn how to improve when your experience was below our aspiration of excellence.

## 2020-11-15 NOTE — Clinical Note
"Christa Mcclendonelle" Dileep was seen and treated in our emergency department on 11/15/2020.  She may return to work on 11/17/2020.       If you have any questions or concerns, please don't hesitate to call.      Angie Sharma PA-C"

## 2020-11-15 NOTE — ED PROVIDER NOTES
Encounter Date: 11/15/2020       History     Chief Complaint   Patient presents with    Abdominal Pain     x 3 days; history of gastric bypass    Vomiting     9:30 AM  Patient is a 30 year female with a history of gastric bypass in 2019, cholecystectomy, presents the ED with 3 day history of epigastric pain, nausea, and vomiting onset today.  Reports unchanged constipation which is chronic.  She is complaining of 10/10 pain to her epigastric region that is nonradiating.  Unsure makes her pain worse or better.  She started vomiting today.  She denies any urinary symptoms such as frequency, dysuria, or hematuria.  No diarrhea.  Reports having episodes similar to today in the past.  She has not tried anything for her symptoms.  Denies any marijuana use.  Last bowel movement on Tuesday, 5 days ago.        Review of patient's allergies indicates:  No Known Allergies  Past Medical History:   Diagnosis Date    Diabetes mellitus     Hypertension      Past Surgical History:   Procedure Laterality Date     SECTION, LOW TRANSVERSE       History reviewed. No pertinent family history.  Social History     Tobacco Use    Smoking status: Never Smoker    Smokeless tobacco: Never Used   Substance Use Topics    Alcohol use: Yes     Comment: occasional    Drug use: No     Review of Systems   Constitutional: Positive for chills. Negative for diaphoresis, fatigue and fever.   HENT: Negative for sore throat.    Respiratory: Negative for cough and shortness of breath.    Cardiovascular: Negative for chest pain.   Gastrointestinal: Positive for abdominal pain, constipation (Chronic), nausea and vomiting. Negative for blood in stool and diarrhea.   Genitourinary: Negative for dysuria, frequency, hematuria, vaginal bleeding and vaginal discharge.   Musculoskeletal: Negative for back pain.   Skin: Negative for rash.   Neurological: Negative for weakness.   Hematological: Does not bruise/bleed easily.       Physical Exam      Initial Vitals [11/15/20 0905]   BP Pulse Resp Temp SpO2   (!) 160/105 90 17 97.4 °F (36.3 °C) 100 %      MAP       --         Physical Exam    Vitals reviewed.  Constitutional: She appears well-developed and well-nourished. She is not diaphoretic. She is cooperative.  Non-toxic appearance. She does not have a sickly appearance. She does not appear ill. No distress. Face mask in place.   HENT:   Head: Normocephalic and atraumatic.   Nose: Nose normal.   Eyes: Conjunctivae and EOM are normal.   Neck: Normal range of motion.   Pulmonary/Chest: No respiratory distress.   Abdominal: Soft. She exhibits no distension. There is no abdominal tenderness. There is no rebound and no guarding.   Musculoskeletal: Normal range of motion.   Neurological: She is alert. She has normal strength.   Skin: Skin is warm and dry. No rash noted. No erythema. No pallor.   Psychiatric: She has a normal mood and affect.         ED Course   Procedures  Labs Reviewed   CBC W/ AUTO DIFFERENTIAL - Abnormal; Notable for the following components:       Result Value    MCHC 30.9 (*)     RDW 15.6 (*)     Platelets 426 (*)     All other components within normal limits   COMPREHENSIVE METABOLIC PANEL - Abnormal; Notable for the following components:    CO2 20 (*)     Total Bilirubin 1.1 (*)     All other components within normal limits   URINALYSIS, REFLEX TO URINE CULTURE - Abnormal; Notable for the following components:    Appearance, UA Hazy (*)     Specific Gravity, UA >=1.030 (*)     Protein, UA 2+ (*)     Ketones, UA 3+ (*)     All other components within normal limits    Narrative:     Specimen Source->Urine   URINALYSIS MICROSCOPIC - Abnormal; Notable for the following components:    WBC, UA 6 (*)     All other components within normal limits    Narrative:     Specimen Source->Urine   LIPASE   POCT URINE PREGNANCY          Imaging Results    None          Medical Decision Making:   History:   Old Medical Records: I decided to obtain old  medical records.  Old Records Summarized: records from clinic visits and records from previous admission(s).  Initial Assessment:   Patient is a 30 year female with a history of gastric bypass in 2019, cholecystectomy, presents the ED with 3 day history of epigastric pain, nausea, and vomiting onset today.   Differential Diagnosis:   Includes but is not limited to gastritis, GERD, constipation, pancreatitis, UTI.  Patient is status post cholecystectomy.  She had mild tenderness in her epigastric region without peritoneal signs.  I doubt acute surgical abdomen.  Clinical Tests:   Lab Tests: Reviewed and Ordered  ED Management:  Will initiate workup, give IV fluids given history of dehydration and vomiting, give Maalox and Zofran, and reassess.    UPT negative.  UA with 3+ ketones.  Suggest duration.  No blood or infection.  CBC without leukocytosis or left shift.  CMP without electrolyte abnormalities.  Normal kidney function.  Mild hyperbilirubinemia at 1.1 with normal liver enzymes.  Lipase within normal limits at 31.    Patient p.o. challenged successfully.  She states that she is ready to be discharge.  She was updated with her labs which do not suggest any acute processes at this time besides signs of starvation and dehydration.  I encouraged her to stay hydrated.  Soft/fluid diet.  Advanced to solids as tolerated.  Will prescribe Zofran for nausea.  Follow up closely with bariatric surgeon.  Return to ED precautions given.  All questions answered.  Patient comfortable with plan and stable for discharge.                             Clinical Impression:     ICD-10-CM ICD-9-CM   1. Non-intractable vomiting with nausea, unspecified vomiting type  R11.2 787.01   2. Abdominal pain, unspecified abdominal location  R10.9 789.00                      Disposition:   Disposition: Discharged  Condition: Stable     ED Disposition Condition    Discharge Stable        ED Prescriptions     Medication Sig Dispense Start Date End  Date Auth. Provider    ondansetron (ZOFRAN) 4 MG tablet Take 1 tablet (4 mg total) by mouth every 6 (six) hours as needed for Nausea. 12 tablet 11/15/2020  Angie Sharma PA-C        Follow-up Information     Follow up With Specialties Details Why Contact Info Additional Information    [facility] Internal Medicine Schedule an appointment as soon as possible for a visit   [address] [facility] [address] [phone] [facility] Please park in surface lot and check in at central registration desk    [facility]  Schedule an appointment as soon as possible for a visit  [phone] [address] [place] [id number]     [facility]  Schedule an appointment as soon as possible for a visit  [phone] [address] [phone]     [facility] Emergency Medicine   [address] [facility] [address] [phone]                                        Angie Sharma PA-C  11/15/20 1145       Angie Sharma PA-C  11/25/20 2049

## 2021-02-11 ENCOUNTER — HOSPITAL ENCOUNTER (OUTPATIENT)
Dept: RADIOLOGY | Facility: HOSPITAL | Age: 31
Discharge: HOME | End: 2021-02-11
Attending: INTERNAL MEDICINE
Payer: COMMERCIAL

## 2021-02-11 DIAGNOSIS — R10.13 ABDOMINAL PAIN, EPIGASTRIC: ICD-10-CM

## 2021-02-11 DIAGNOSIS — B96.81 HELICOBACTER PYLORI GASTRITIS: ICD-10-CM

## 2021-02-11 DIAGNOSIS — K29.70 HELICOBACTER PYLORI GASTRITIS: ICD-10-CM

## 2021-02-11 DIAGNOSIS — R03.0 ELEVATED BLOOD PRESSURE READING: ICD-10-CM

## 2021-02-11 DIAGNOSIS — R11.2 NAUSEA WITH VOMITING: ICD-10-CM

## 2021-02-11 LAB
CREAT SERPL-MCNC: 0.5 MG/DL (ref 0.5–1.4)
SAMPLE: NORMAL

## 2021-02-11 PROCEDURE — 74177 CT ABD & PELVIS W/CONTRAST: CPT | Mod: TC

## 2021-02-11 PROCEDURE — A9698 NON-RAD CONTRAST MATERIALNOC: HCPCS | Performed by: INTERNAL MEDICINE

## 2021-02-11 PROCEDURE — 74177 CT ABDOMEN PELVIS WITH CONTRAST: ICD-10-PCS | Mod: 26,,, | Performed by: RADIOLOGY

## 2021-02-11 PROCEDURE — 74177 CT ABD & PELVIS W/CONTRAST: CPT | Mod: 26,,, | Performed by: RADIOLOGY

## 2021-02-11 PROCEDURE — 25500020 PHARM REV CODE 255: Performed by: INTERNAL MEDICINE

## 2021-02-11 RX ADMIN — IOHEXOL 75 ML: 350 INJECTION, SOLUTION INTRAVENOUS at 10:02

## 2021-02-11 RX ADMIN — IOHEXOL 1000 ML: 9 SOLUTION ORAL at 08:02

## 2021-07-22 ENCOUNTER — CLINICAL SUPPORT (OUTPATIENT)
Dept: URGENT CARE | Facility: CLINIC | Age: 31
End: 2021-07-22
Payer: COMMERCIAL

## 2021-07-22 DIAGNOSIS — Z20.828 EXPOSURE TO VIRAL DISEASE: Primary | ICD-10-CM

## 2021-07-22 LAB
CTP QC/QA: YES
SARS-COV-2 RDRP RESP QL NAA+PROBE: NEGATIVE

## 2021-07-22 PROCEDURE — U0002: ICD-10-PCS | Mod: QW,S$GLB,, | Performed by: FAMILY MEDICINE

## 2021-07-22 PROCEDURE — U0002 COVID-19 LAB TEST NON-CDC: HCPCS | Mod: QW,S$GLB,, | Performed by: FAMILY MEDICINE

## 2022-06-13 ENCOUNTER — HOSPITAL ENCOUNTER (EMERGENCY)
Facility: OTHER | Age: 32
Discharge: HOME | End: 2022-06-13
Attending: EMERGENCY MEDICINE
Payer: MEDICARE

## 2022-06-13 VITALS
SYSTOLIC BLOOD PRESSURE: 176 MMHG | BODY MASS INDEX: 16.67 KG/M2 | TEMPERATURE: 99 F | HEART RATE: 95 BPM | OXYGEN SATURATION: 100 % | DIASTOLIC BLOOD PRESSURE: 112 MMHG | RESPIRATION RATE: 20 BRPM | WEIGHT: 110 LBS | HEIGHT: 68 IN

## 2022-06-13 DIAGNOSIS — R10.9 ABDOMINAL PAIN: ICD-10-CM

## 2022-06-13 DIAGNOSIS — G89.4 CHRONIC PAIN SYNDROME: Primary | ICD-10-CM

## 2022-06-13 LAB
ALBUMIN SERPL BCP-MCNC: 3.6 G/DL (ref 3.5–5.2)
ALP SERPL-CCNC: 87 U/L (ref 55–135)
ALT SERPL W/O P-5'-P-CCNC: 15 U/L (ref 10–44)
ANION GAP SERPL CALC-SCNC: 17 MMOL/L (ref 8–16)
AST SERPL-CCNC: 27 U/L (ref 10–40)
B-HCG UR QL: NEGATIVE
BASOPHILS # BLD AUTO: 0.04 K/UL (ref 0–0.2)
BASOPHILS NFR BLD: 1 % (ref 0–1.9)
BILIRUB SERPL-MCNC: 0.7 MG/DL (ref 0.1–1)
BILIRUB UR QL STRIP: ABNORMAL
BUN SERPL-MCNC: 11 MG/DL (ref 6–20)
CALCIUM SERPL-MCNC: 9.1 MG/DL (ref 8.7–10.5)
CHLORIDE SERPL-SCNC: 102 MMOL/L (ref 95–110)
CLARITY UR: CLEAR
CO2 SERPL-SCNC: 19 MMOL/L (ref 23–29)
COLOR UR: YELLOW
CREAT SERPL-MCNC: 0.7 MG/DL (ref 0.5–1.4)
CTP QC/QA: YES
DIFFERENTIAL METHOD: ABNORMAL
EOSINOPHIL # BLD AUTO: 0 K/UL (ref 0–0.5)
EOSINOPHIL NFR BLD: 0 % (ref 0–8)
ERYTHROCYTE [DISTWIDTH] IN BLOOD BY AUTOMATED COUNT: 15.9 % (ref 11.5–14.5)
EST. GFR  (AFRICAN AMERICAN): >60 ML/MIN/1.73 M^2
EST. GFR  (NON AFRICAN AMERICAN): >60 ML/MIN/1.73 M^2
GLUCOSE SERPL-MCNC: 104 MG/DL (ref 70–110)
GLUCOSE UR QL STRIP: NEGATIVE
HCT VFR BLD AUTO: 31.7 % (ref 37–48.5)
HGB BLD-MCNC: 10.2 G/DL (ref 12–16)
HGB UR QL STRIP: NEGATIVE
IMM GRANULOCYTES # BLD AUTO: 0 K/UL (ref 0–0.04)
IMM GRANULOCYTES NFR BLD AUTO: 0 % (ref 0–0.5)
KETONES UR QL STRIP: ABNORMAL
LEUKOCYTE ESTERASE UR QL STRIP: NEGATIVE
LIPASE SERPL-CCNC: 44 U/L (ref 4–60)
LYMPHOCYTES # BLD AUTO: 1.5 K/UL (ref 1–4.8)
LYMPHOCYTES NFR BLD: 36.9 % (ref 18–48)
MCH RBC QN AUTO: 26.6 PG (ref 27–31)
MCHC RBC AUTO-ENTMCNC: 32.2 G/DL (ref 32–36)
MCV RBC AUTO: 83 FL (ref 82–98)
MONOCYTES # BLD AUTO: 0.4 K/UL (ref 0.3–1)
MONOCYTES NFR BLD: 9.3 % (ref 4–15)
NEUTROPHILS # BLD AUTO: 2.2 K/UL (ref 1.8–7.7)
NEUTROPHILS NFR BLD: 52.8 % (ref 38–73)
NITRITE UR QL STRIP: NEGATIVE
NRBC BLD-RTO: 0 /100 WBC
PH UR STRIP: 6 [PH] (ref 5–8)
PLATELET # BLD AUTO: 481 K/UL (ref 150–450)
PMV BLD AUTO: 8.8 FL (ref 9.2–12.9)
POTASSIUM SERPL-SCNC: 3.7 MMOL/L (ref 3.5–5.1)
PROT SERPL-MCNC: 7 G/DL (ref 6–8.4)
PROT UR QL STRIP: NEGATIVE
RBC # BLD AUTO: 3.84 M/UL (ref 4–5.4)
SODIUM SERPL-SCNC: 138 MMOL/L (ref 136–145)
SP GR UR STRIP: >=1.03 (ref 1–1.03)
URN SPEC COLLECT METH UR: ABNORMAL
UROBILINOGEN UR STRIP-ACNC: NEGATIVE EU/DL
WBC # BLD AUTO: 4.09 K/UL (ref 3.9–12.7)

## 2022-06-13 PROCEDURE — 99284 EMERGENCY DEPT VISIT MOD MDM: CPT | Mod: 25

## 2022-06-13 PROCEDURE — 63600175 PHARM REV CODE 636 W HCPCS: Performed by: EMERGENCY MEDICINE

## 2022-06-13 PROCEDURE — 96361 HYDRATE IV INFUSION ADD-ON: CPT

## 2022-06-13 PROCEDURE — 83690 ASSAY OF LIPASE: CPT | Performed by: EMERGENCY MEDICINE

## 2022-06-13 PROCEDURE — 85025 COMPLETE CBC W/AUTO DIFF WBC: CPT | Performed by: EMERGENCY MEDICINE

## 2022-06-13 PROCEDURE — 25000003 PHARM REV CODE 250: Performed by: EMERGENCY MEDICINE

## 2022-06-13 PROCEDURE — 93005 ELECTROCARDIOGRAM TRACING: CPT

## 2022-06-13 PROCEDURE — 81003 URINALYSIS AUTO W/O SCOPE: CPT | Performed by: EMERGENCY MEDICINE

## 2022-06-13 PROCEDURE — 96375 TX/PRO/DX INJ NEW DRUG ADDON: CPT

## 2022-06-13 PROCEDURE — 81025 URINE PREGNANCY TEST: CPT | Performed by: EMERGENCY MEDICINE

## 2022-06-13 PROCEDURE — 96374 THER/PROPH/DIAG INJ IV PUSH: CPT

## 2022-06-13 PROCEDURE — 80053 COMPREHEN METABOLIC PANEL: CPT | Performed by: EMERGENCY MEDICINE

## 2022-06-13 PROCEDURE — 93010 ELECTROCARDIOGRAM REPORT: CPT | Mod: ,,, | Performed by: INTERNAL MEDICINE

## 2022-06-13 PROCEDURE — 93010 EKG 12-LEAD: ICD-10-PCS | Mod: ,,, | Performed by: INTERNAL MEDICINE

## 2022-06-13 RX ORDER — KETOROLAC TROMETHAMINE 30 MG/ML
15 INJECTION, SOLUTION INTRAMUSCULAR; INTRAVENOUS
Status: COMPLETED | OUTPATIENT
Start: 2022-06-13 | End: 2022-06-13

## 2022-06-13 RX ORDER — DICYCLOMINE HYDROCHLORIDE 20 MG/1
20 TABLET ORAL 2 TIMES DAILY PRN
Qty: 20 TABLET | Refills: 0 | Status: SHIPPED | OUTPATIENT
Start: 2022-06-13 | End: 2022-07-13

## 2022-06-13 RX ORDER — DROPERIDOL 2.5 MG/ML
1.25 INJECTION, SOLUTION INTRAMUSCULAR; INTRAVENOUS ONCE
Status: COMPLETED | OUTPATIENT
Start: 2022-06-13 | End: 2022-06-13

## 2022-06-13 RX ADMIN — DROPERIDOL 1.25 MG: 2.5 INJECTION, SOLUTION INTRAMUSCULAR; INTRAVENOUS at 04:06

## 2022-06-13 RX ADMIN — SODIUM CHLORIDE 1000 ML: 0.9 INJECTION, SOLUTION INTRAVENOUS at 02:06

## 2022-06-13 RX ADMIN — KETOROLAC TROMETHAMINE 15 MG: 30 INJECTION, SOLUTION INTRAMUSCULAR at 02:06

## 2022-06-13 NOTE — ED PROVIDER NOTES
Encounter Date: 2022    SCRIBE #1 NOTE: I, Xavier Santiago, am scribing for, and in the presence of, Vidhya Brand MD.       History     Chief Complaint   Patient presents with    Abdominal Pain     Lower abd pain with N/V since this am. Pt received 4 mg zofran and 50 mcg of fentanyl PTA     Time seen by provider: 2:00 PM    This is a 31 y.o. female, with a PMHx of HTN and DM, who presents to the ED with complaint of worsening abdominal pain. Patient states she had a procedure done at [facility] for celiac nerve pain 13 days ago for her chronic abdominal pain, which she reports is associated with a gastric bypass in 2019. She states she was not prescribed any pain medications. Patient further complains of shortness of breath and weakness. She notes nausea and vomiting that started 4 days ago. She reports she has not ate anything for the past 4 days. Patient denies taking antiemetics at home, but was given Zofran by EMS en route to the ED, which she states is helping with the nausea. She states she has never had COVID-19 . This is the extent of the patient's complaints at this time.    The history is provided by the patient.     Review of patient's allergies indicates:  No Known Allergies  Past Medical History:   Diagnosis Date    Diabetes mellitus     Hypertension      Past Surgical History:   Procedure Laterality Date     SECTION, LOW TRANSVERSE       No family history on file.  Social History     Tobacco Use    Smoking status: Never Smoker    Smokeless tobacco: Never Used   Substance Use Topics    Alcohol use: Yes     Comment: occasional    Drug use: No     Review of Systems   Constitutional: Positive for appetite change (decreased). Negative for activity change, chills, diaphoresis and fever.   HENT: Negative for congestion, sore throat and trouble swallowing.    Eyes: Negative for photophobia and visual disturbance.   Respiratory: Positive for shortness of breath. Negative  for cough and chest tightness.    Cardiovascular: Negative for chest pain.   Gastrointestinal: Positive for abdominal pain (chronic), diarrhea (resolved), nausea and vomiting.   Endocrine: Negative for polydipsia and polyuria.   Genitourinary: Negative for difficulty urinating and flank pain.   Musculoskeletal: Negative for back pain and neck pain.   Skin: Negative for rash.   Neurological: Positive for weakness. Negative for headaches.   Psychiatric/Behavioral: Negative for confusion.       Physical Exam     Initial Vitals [06/13/22 1330]   BP Pulse Resp Temp SpO2   (!) 156/90 82 18 98.4 °F (36.9 °C) 100 %      MAP       --         Physical Exam    Nursing note and vitals reviewed.  Constitutional: She appears well-nourished. She is cooperative.  Non-toxic appearance. No distress.   Chronically ill appearing, thin.    HENT:   Head: Normocephalic and atraumatic.   Mouth/Throat: Oropharynx is clear and moist.   Dry mucous membranes.   Eyes: EOM are normal. Pupils are equal, round, and reactive to light.   No conjunctival pallor.    Neck: Neck supple. No thyromegaly present.   Normal range of motion.   Full passive range of motion without pain.     Cardiovascular: Normal rate, regular rhythm, normal heart sounds and normal pulses.   Pulmonary/Chest: Effort normal and breath sounds normal. No respiratory distress.   Abdominal: Abdomen is soft. Bowel sounds are normal. She exhibits no distension.   Thin abdomen, tenderness to LUQ.    Musculoskeletal:         General: Normal range of motion.      Cervical back: Full passive range of motion without pain, normal range of motion and neck supple.     Neurological: She is alert and oriented to person, place, and time. She has normal strength. No cranial nerve deficit or sensory deficit.   Skin: Skin is warm, dry and intact. No rash noted.   Psychiatric: She has a normal mood and affect. Her speech is normal and behavior is normal. Judgment and thought content normal.          ED Course   Procedures  Labs Reviewed   URINALYSIS, REFLEX TO URINE CULTURE - Abnormal; Notable for the following components:       Result Value    Specific Gravity, UA >=1.030 (*)     Ketones, UA 3+ (*)     Bilirubin (UA) 1+ (*)     All other components within normal limits    Narrative:     Specimen Source->Urine   CBC W/ AUTO DIFFERENTIAL - Abnormal; Notable for the following components:    RBC 3.84 (*)     Hemoglobin 10.2 (*)     Hematocrit 31.7 (*)     MCH 26.6 (*)     RDW 15.9 (*)     Platelets 481 (*)     MPV 8.8 (*)     All other components within normal limits   COMPREHENSIVE METABOLIC PANEL - Abnormal; Notable for the following components:    CO2 19 (*)     Anion Gap 17 (*)     All other components within normal limits   LIPASE   POCT URINE PREGNANCY     EKG Readings: (Independently Interpreted)   Normal sinus rhythm with a rate of 88 bpm, narrow QRS, normal intervals, no STEMI.        Imaging Results    None          Medications   ketorolac injection 15 mg (15 mg Intravenous Given by Other 6/13/22 1434)   sodium chloride 0.9% bolus 1,000 mL (0 mLs Intravenous Stopped 6/13/22 1558)   droperidoL injection 1.25 mg (1.25 mg Intravenous Given 6/13/22 1615)     Medical Decision Making:   History:   Old Medical Records: I decided to obtain old medical records.  Initial Assessment:   Urgent evaluation of 31-year-old female with chronic abdominal pain following gastric bypass surgery in 2019 at [facility], status post cholecystectomy 2 years previously.  Patient follows at [facility], recently received a celiac plexus block for chronic discomfort recently, but reports unrelieved discomfort..  Per epic review patient has had multiple evaluations for similar discomfort, multiple CT scans, imaging all unremarkable- given her reassuring exam here, I do not think repeat imaging necessary at this time.  Will trial analgesics, IV fluids, obtain labs and reassess.    Independently Interpreted Test(s):   I have ordered and  independently interpreted EKG Reading(s) - see prior notes  Clinical Tests:   Lab Tests: Ordered and Reviewed  Medical Tests: Ordered and Reviewed  ED Management:  Pain greatly improved following droperidol, no significant metabolic disturbances.  Patient encouraged to follow up w strict return precautions discussed.           Scribe Attestation:   Scribe #1: I performed the above scribed service and the documentation accurately describes the services I performed. I attest to the accuracy of the note.        ED Course as of 06/13/22 1642 Mon Jun 13, 2022   1634 4:23 PM  Symptoms improved. Ready to go home.  [SS]      ED Course User Index  [SS] Xavier Santiago      Physician Attestation for Scribe: I, Cathie, reviewed documentation as scribed in my presence, which is both accurate and complete.       Clinical Impression:   Final diagnoses:  [R10.9] Abdominal pain  [G89.4] Chronic pain syndrome (Primary)          ED Disposition Condition    Discharge Stable        ED Prescriptions     Medication Sig Dispense Start Date End Date Auth. Provider    dicyclomine (BENTYL) 20 mg tablet Take 1 tablet (20 mg total) by mouth 2 (two) times daily as needed (abdominal pain). 20 tablet 6/13/2022 7/13/2022 Vidhya Brand MD        Follow-up Information     Follow up With Specialties Details Why Contact Info    Nohelia Stafford MD Internal Medicine Schedule an appointment as soon as possible for a visit   [address] [facility]  [address]  [facility] [address]  [phone]             Vidhya Brand MD  06/13/22 1642

## 2022-06-13 NOTE — ED NOTES
Dr. Brand notified pt states pain 10/10 and repeat bp:176/112. Pt denies hx of HTN. AAOx3. Skin is warm and dry. Resp:20 even and unlabored. Speech is clear and coherent. Denies cp, sob, dizziness, lightheadedness, n/v, HA, blurred vision or any other discomfort at this time. MD states she was coming to speak with patient now. Pt informed MD was coming to speak with her now. Discharge delayed.

## 2022-06-13 NOTE — ED NOTES
Pt to Ed with abdominal pain. 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 Pt states she had a procedure at [facility] to burn the celiac nerve but pain has again returned. Pt states that she received nausea medication in the ambulance so nausea is better. Pt reports pain 9/10 at this time. Pt appears emaciated and very thin, is easily distracted and appears difficult to focus. Labs collected and sent for processing. Will cont to monitor.

## 2023-11-03 ENCOUNTER — HOSPITAL ENCOUNTER (EMERGENCY)
Facility: OTHER | Age: 33
Discharge: HOME | End: 2023-11-03
Attending: EMERGENCY MEDICINE
Payer: MEDICARE

## 2023-11-03 VITALS
TEMPERATURE: 98 F | HEART RATE: 80 BPM | RESPIRATION RATE: 18 BRPM | SYSTOLIC BLOOD PRESSURE: 142 MMHG | DIASTOLIC BLOOD PRESSURE: 97 MMHG | OXYGEN SATURATION: 99 %

## 2023-11-03 DIAGNOSIS — R10.9 ABDOMINAL PAIN, UNSPECIFIED ABDOMINAL LOCATION: ICD-10-CM

## 2023-11-03 DIAGNOSIS — R11.2 NAUSEA AND VOMITING, UNSPECIFIED VOMITING TYPE: Primary | ICD-10-CM

## 2023-11-03 LAB
ALBUMIN SERPL BCP-MCNC: 1.4 G/DL (ref 3.5–5.2)
ALP SERPL-CCNC: 77 U/L (ref 55–135)
ALT SERPL W/O P-5'-P-CCNC: 24 U/L (ref 10–44)
ANION GAP SERPL CALC-SCNC: 7 MMOL/L (ref 8–16)
AST SERPL-CCNC: 39 U/L (ref 10–40)
B-HCG UR QL: NEGATIVE
BASOPHILS # BLD AUTO: 0.02 K/UL (ref 0–0.2)
BASOPHILS NFR BLD: 0.1 % (ref 0–1.9)
BILIRUB SERPL-MCNC: 0.4 MG/DL (ref 0.1–1)
BUN SERPL-MCNC: 5 MG/DL (ref 6–20)
CALCIUM SERPL-MCNC: 7 MG/DL (ref 8.7–10.5)
CHLORIDE SERPL-SCNC: 106 MMOL/L (ref 95–110)
CO2 SERPL-SCNC: 23 MMOL/L (ref 23–29)
CREAT SERPL-MCNC: 0.5 MG/DL (ref 0.5–1.4)
CTP QC/QA: YES
DIFFERENTIAL METHOD: ABNORMAL
EOSINOPHIL # BLD AUTO: 0 K/UL (ref 0–0.5)
EOSINOPHIL NFR BLD: 0 % (ref 0–8)
ERYTHROCYTE [DISTWIDTH] IN BLOOD BY AUTOMATED COUNT: 17.8 % (ref 11.5–14.5)
EST. GFR  (NO RACE VARIABLE): >60 ML/MIN/1.73 M^2
GLUCOSE SERPL-MCNC: 113 MG/DL (ref 70–110)
HCT VFR BLD AUTO: 28.7 % (ref 37–48.5)
HCV AB SERPL QL IA: NEGATIVE
HGB BLD-MCNC: 9 G/DL (ref 12–16)
HIV 1+2 AB+HIV1 P24 AG SERPL QL IA: NEGATIVE
IMM GRANULOCYTES # BLD AUTO: 0.06 K/UL (ref 0–0.04)
IMM GRANULOCYTES NFR BLD AUTO: 0.4 % (ref 0–0.5)
LIPASE SERPL-CCNC: 136 U/L (ref 4–60)
LYMPHOCYTES # BLD AUTO: 1.5 K/UL (ref 1–4.8)
LYMPHOCYTES NFR BLD: 10.4 % (ref 18–48)
MCH RBC QN AUTO: 24.9 PG (ref 27–31)
MCHC RBC AUTO-ENTMCNC: 31.4 G/DL (ref 32–36)
MCV RBC AUTO: 80 FL (ref 82–98)
MONOCYTES # BLD AUTO: 1 K/UL (ref 0.3–1)
MONOCYTES NFR BLD: 7.1 % (ref 4–15)
NEUTROPHILS # BLD AUTO: 11.6 K/UL (ref 1.8–7.7)
NEUTROPHILS NFR BLD: 82 % (ref 38–73)
NRBC BLD-RTO: 0 /100 WBC
PLATELET # BLD AUTO: 782 K/UL (ref 150–450)
PMV BLD AUTO: 8.6 FL (ref 9.2–12.9)
POTASSIUM SERPL-SCNC: 2.9 MMOL/L (ref 3.5–5.1)
PROT SERPL-MCNC: 4.7 G/DL (ref 6–8.4)
RBC # BLD AUTO: 3.61 M/UL (ref 4–5.4)
SODIUM SERPL-SCNC: 136 MMOL/L (ref 136–145)
WBC # BLD AUTO: 14.16 K/UL (ref 3.9–12.7)

## 2023-11-03 PROCEDURE — 96365 THER/PROPH/DIAG IV INF INIT: CPT

## 2023-11-03 PROCEDURE — 96375 TX/PRO/DX INJ NEW DRUG ADDON: CPT

## 2023-11-03 PROCEDURE — 96361 HYDRATE IV INFUSION ADD-ON: CPT

## 2023-11-03 PROCEDURE — 80053 COMPREHEN METABOLIC PANEL: CPT | Performed by: EMERGENCY MEDICINE

## 2023-11-03 PROCEDURE — 25000003 PHARM REV CODE 250: Performed by: EMERGENCY MEDICINE

## 2023-11-03 PROCEDURE — 99285 EMERGENCY DEPT VISIT HI MDM: CPT | Mod: 25

## 2023-11-03 PROCEDURE — 85025 COMPLETE CBC W/AUTO DIFF WBC: CPT | Performed by: EMERGENCY MEDICINE

## 2023-11-03 PROCEDURE — 86803 HEPATITIS C AB TEST: CPT | Performed by: EMERGENCY MEDICINE

## 2023-11-03 PROCEDURE — 87389 HIV-1 AG W/HIV-1&-2 AB AG IA: CPT | Performed by: EMERGENCY MEDICINE

## 2023-11-03 PROCEDURE — 81025 URINE PREGNANCY TEST: CPT | Performed by: EMERGENCY MEDICINE

## 2023-11-03 PROCEDURE — 83690 ASSAY OF LIPASE: CPT | Performed by: EMERGENCY MEDICINE

## 2023-11-03 PROCEDURE — 96366 THER/PROPH/DIAG IV INF ADDON: CPT

## 2023-11-03 PROCEDURE — 63600175 PHARM REV CODE 636 W HCPCS: Performed by: EMERGENCY MEDICINE

## 2023-11-03 RX ORDER — POTASSIUM CHLORIDE 7.45 MG/ML
10 INJECTION INTRAVENOUS
Status: COMPLETED | OUTPATIENT
Start: 2023-11-03 | End: 2023-11-03

## 2023-11-03 RX ORDER — OXYCODONE AND ACETAMINOPHEN 10; 325 MG/1; MG/1
1 TABLET ORAL EVERY 6 HOURS PRN
Qty: 12 TABLET | Refills: 0 | Status: SHIPPED | OUTPATIENT
Start: 2023-11-03 | End: 2023-11-03 | Stop reason: SDUPTHER

## 2023-11-03 RX ORDER — OXYCODONE AND ACETAMINOPHEN 10; 325 MG/1; MG/1
1 TABLET ORAL EVERY 6 HOURS PRN
Qty: 12 TABLET | Refills: 0 | Status: SHIPPED | OUTPATIENT
Start: 2023-11-03

## 2023-11-03 RX ORDER — ONDANSETRON 4 MG/1
4 TABLET, ORALLY DISINTEGRATING ORAL EVERY 8 HOURS PRN
Qty: 15 TABLET | Refills: 0 | Status: SHIPPED | OUTPATIENT
Start: 2023-11-03 | End: 2023-11-03 | Stop reason: SDUPTHER

## 2023-11-03 RX ORDER — ONDANSETRON 4 MG/1
4 TABLET, ORALLY DISINTEGRATING ORAL EVERY 8 HOURS PRN
Qty: 15 TABLET | Refills: 0 | Status: SHIPPED | OUTPATIENT
Start: 2023-11-03

## 2023-11-03 RX ORDER — ONDANSETRON 2 MG/ML
4 INJECTION INTRAMUSCULAR; INTRAVENOUS
Status: COMPLETED | OUTPATIENT
Start: 2023-11-03 | End: 2023-11-03

## 2023-11-03 RX ADMIN — ONDANSETRON 4 MG: 2 INJECTION INTRAMUSCULAR; INTRAVENOUS at 01:11

## 2023-11-03 RX ADMIN — SODIUM CHLORIDE 500 ML: 9 INJECTION, SOLUTION INTRAVENOUS at 01:11

## 2023-11-03 RX ADMIN — POTASSIUM CHLORIDE 10 MEQ: 10 INJECTION, SOLUTION INTRAVENOUS at 01:11

## 2023-11-03 NOTE — ED NOTES
LOC: The patient is awake, alert, and oriented to self, place, time, and situation though  appears to be experiencing some memory loss. Pt is calm and cooperative. Affect is appropriate.  Speech is appropriate and clear.     APPEARANCE: Patient appears generally unwell. Pt resting on stretcher in no acute distress.  Patient is clean and well groomed.    SKIN: The skin is warm and dry; color consistent with ethnicity.  Patient has poor skin turgor and dry mucus membranes.  Skin intact; no breakdown or bruising noted.     MUSCULOSKELETAL: Patient moving upper and lower extremities without difficulty but reports generalized weakness; denies pain in the extremities but reports pain in the right flank.     RESPIRATORY: Airway is open and patent. Respirations spontaneous, even, easy, and non-labored.  Patient has a normal effort and rate.  No accessory muscle use noted. Denies cough.     CARDIAC:  Pt is hypertensive. Normal rate noted. 3+ pitting edema noted to the BLE. No complaints of chest pain.     ABDOMEN: Soft but tender to palpation across the right and left lower abdomen.  o distention noted. Pt reports nausea and vomiting; denies diarrhea, or constipation.    NEUROLOGIC: Eyes open spontaneously.  Behavior appropriate to situation.  Follows commands; facial expression symmetrical.  Purposeful motor response noted; normal sensation in all extremities. Pt denies headache; denies lightheadedness or dizziness; denies visual disturbances; denies loss of balance; denies unilateral weakness.

## 2023-11-03 NOTE — ED TRIAGE NOTES
Pt presents to the ER with complaints of right  flank pain with pain across the lower abdomen with nausea and vomiting for the past several days. Pt with a reports chronic illness of abdominal pain, vomiting, fluid retention, and kidney disease. Pt reporting a recent diagnosis of a blood clot to her leg and lungs; has not yet started her Eliquis.

## 2023-11-03 NOTE — ED PROVIDER NOTES
"Encounter Date: 11/3/2023    SCRIBE #1 NOTE: I, Miguel A Dunham, am scribing for, and in the presence of,  Lloyd Pantoja MD. I have scribed the following portions of the note - Other sections scribed: HPI, ROS, PE.       History     Chief Complaint   Patient presents with    Vomiting     Home health nurse reports vomiting x1 day. Pt reports fall yesterday, was seen at [facility] for same complaint. EMS reports confusion at baseline.      Time seen by provider: 12:57 PM    This is a 32 y.o. female with a PMHx of HTN, DM, and a gastric bypass () who presents via EMS with complaint of right flank pain and vomiting. She was seen at [facility] last night for same symptoms, but left AMA prior to receiving imaging. The patient reports chronic abdominal pain from her gastric bypass surgery, and now reports that pain worsening with nausea and vomiting that began yesterday associated with her right flank pain. She additionally reports an increase in urinary frequency, but denies any hematuria or dysuria. Her mother states the she has been swelling "top to bottom" for the last 2 years. She goes on to state that her daughter has been told about having blood clots and a kidney that leaks protein while being admitted at [facility] last week. She has not been given any medications for these symptoms. She is supposed to be on Eliquis, but has not taken them yet. Her mother finally reports dyspnea without a cough with no other related symptoms.    Patient denies any other complaints at this time.    The history is provided by the patient and a parent.     Review of patient's allergies indicates:  No Known Allergies  Past Medical History:   Diagnosis Date    Diabetes mellitus     Hypertension      Past Surgical History:   Procedure Laterality Date     SECTION, LOW TRANSVERSE       History reviewed. No pertinent family history.  Social History     Tobacco Use    Smoking status: Never    Smokeless tobacco: Never   Substance Use Topics    " Alcohol use: Yes     Comment: occasional    Drug use: No     Review of Systems   HENT:  Negative for congestion.    Eyes:  Negative for redness.   Respiratory:  Positive for shortness of breath. Negative for cough.    Cardiovascular:  Negative for chest pain.   Gastrointestinal:  Positive for abdominal pain, nausea and vomiting.   Endocrine: Negative for polyuria.   Genitourinary:  Positive for flank pain and frequency. Negative for dysuria and hematuria.   Musculoskeletal:  Positive for back pain.   Skin:  Negative for rash.   Neurological:  Negative for headaches.   Psychiatric/Behavioral:  Negative for confusion.        Physical Exam     Initial Vitals [11/03/23 1142]   BP Pulse Resp Temp SpO2   (!) 147/97 80 18 98 °F (36.7 °C) 99 %      MAP       --         Physical Exam    Constitutional: She is not diaphoretic. She appears ill (chronic). No distress.   HENT:   Head: Normocephalic and atraumatic.   Eyes: Conjunctivae are normal.   Neck: Neck supple.   Cardiovascular:  Normal rate, regular rhythm, S1 normal, S2 normal, normal heart sounds and intact distal pulses.           No murmur heard.  Pulmonary/Chest: Breath sounds normal. No respiratory distress. She has no wheezes. She has no rhonchi. She has no rales.   Abdominal: Abdomen is soft. There is abdominal tenderness (diffuse R sided).   There is right CVA tenderness.There is no rebound and no guarding.   Musculoskeletal:         General: No edema.      Cervical back: Neck supple.     Neurological: She is alert and oriented to person, place, and time.   Skin: Skin is warm and dry.   Psychiatric: She has a normal mood and affect.         ED Course   Procedures  Labs Reviewed   CBC W/ AUTO DIFFERENTIAL - Abnormal; Notable for the following components:       Result Value    WBC 14.16 (*)     RBC 3.61 (*)     Hemoglobin 9.0 (*)     Hematocrit 28.7 (*)     MCV 80 (*)     MCH 24.9 (*)     MCHC 31.4 (*)     RDW 17.8 (*)     Platelets 782 (*)     MPV 8.6 (*)     Gran  # (ANC) 11.6 (*)     Immature Grans (Abs) 0.06 (*)     Gran % 82.0 (*)     Lymph % 10.4 (*)     All other components within normal limits   COMPREHENSIVE METABOLIC PANEL - Abnormal; Notable for the following components:    Potassium 2.9 (*)     Glucose 113 (*)     BUN 5 (*)     Calcium 7.0 (*)     Total Protein 4.7 (*)     Albumin 1.4 (*)     Anion Gap 7 (*)     All other components within normal limits   LIPASE - Abnormal; Notable for the following components:    Lipase 136 (*)     All other components within normal limits   HIV 1 / 2 ANTIBODY    Narrative:     Release to patient->Immediate   HEPATITIS C ANTIBODY    Narrative:     Release to patient->Immediate   URINALYSIS, REFLEX TO URINE CULTURE   POCT URINE PREGNANCY          Imaging Results              CT Renal Stone Study ABD Pelvis WO (Final result)  Result time 11/03/23 15:07:31      Final result by Rao Nielsen MD (11/03/23 15:07:31)                   Impression:      1. No radiodense calculus seen within urinary tract or evidence of obstructive uropathy.  2. Findings suggesting 3rd spacing/fluid volume overload including small volume abdominopelvic ascites, diffuse mesenteric edema and body wall edema/anasarca, worse from 02/11/2021.  3. Remote postoperative changes of cholecystectomy and gastric bypass.  4. Right lower lobe new dependent consolidation which may reflect atelectasis versus aspiration or pneumonia.  Further evaluation/follow-up as warranted.  5. Few additional findings as above.      Electronically signed by: Rao Nielsen MD  Date:    11/03/2023  Time:    15:07               Narrative:    EXAMINATION:  CT RENAL STONE STUDY ABD PELVIS WO    CLINICAL HISTORY:  Flank pain, kidney stone suspected;    TECHNIQUE:  Low dose axial images, sagittal and coronal reformations were obtained from the lung bases to the pubic symphysis.  Contrast was not administered.    COMPARISON:  CT abdomen and pelvis 02/11/2021, pelvic ultrasound  10/30/2017    FINDINGS:  Lack of IV contrast limits evaluation of soft tissue and vascular structures.    New focus of dependent consolidation at the posteromedial right lower lobe.  Remainder of the lung bases are clear.    Base of the heart is normal in size with trace volume nonspecific pericardial fluid similar to prior.    Suspected remote cholecystectomy.  No biliary ductal dilatation.    Remote postoperative changes of prior gastric bypass with small bowel anastomosis in the left hemiabdomen.  Stomach and duodenum are otherwise grossly within normal limits.  No convincing evidence of bowel obstruction or acute inflammation allowing for paucity of intra-abdominal fat with closely apposed loops of bowel and noncontrast technique.  No pneumatosis or portal venous gas.  Appendix and terminal ileum are within normal limits.    Noncontrast appearance of the liver and bilateral adrenal glands are within normal limits.  No significant abnormality seen of the pancreas allowing for lack of IV contrast.    Bilateral kidneys are normal in overall size, shape and location.  No radiodense calculus seen within the urinary tract.  Ureters are normal in course and caliber.  Urinary bladder is well distended without wall thickening.  Uterus and bilateral adnexa are within normal limits.    Trace volume free fluid tracking along the bilateral pericolic gutters to the mesenteric root.  There is small volume nonspecific free pelvic fluid.  There is diffuse mesenteric haziness suggesting mesenteric edema.  No free air or definite lymphadenopathy.  No calcific atherosclerosis.  Aorta tapers normally.    There is diffuse body wall subcutaneous fat stranding suggesting edema/anasarca.    Osseous structures appear stable without acute process seen.                                       Medications   ondansetron injection 4 mg (4 mg Intravenous Given 11/3/23 5455)   sodium chloride 0.9% bolus 500 mL 500 mL (0 mLs Intravenous Stopped  11/3/23 1403)   potassium chloride 10 mEq in 100 mL IVPB (0 mEq Intravenous Stopped 11/3/23 1541)     Medical Decision Making      32-year-old female with history of DM, HTN, history of gastric bypass presents for evaluation of persistent right-sided flank/abdominal pain and vomiting.  Initial onset about 2 days ago, family at bedside states that she has had multiple episodes similar in the past with no clear etiology, patient states that this pain is somewhat different, since it is usually located to her upper abdomen.  She has been unable to hold down any liquids since yesterday, went to [facility] ED last night and had labs done, treated with IV antiemetics and pain control and left AMA prior to imaging and further workup.  Patient also states that she was recently admitted at [facility] and found to have DVT and PE, discharged with Eliquis 2 days ago but has not started taking it yet.  She also states that she could have protein leaking from her liver and has had issues with generalized edema for months, and was told that she may have lupus as well.  However she was not provide any medications for these other issues.      Per chart review of [facility] visit last night, she complained of right flank pain and vomiting similar to this, but she signed out AMA after getting IV droperidol and morphine with improvement.  She also was noted to drink 5 alcoholic beverages per day. On arrival now patient resting comfortably in no distress, appears chronically ill with right CVA and diffuse abdominal tenderness.  Unclear etiology of symptoms, will rule UTI/pyelonephritis though it was negative at [facility] yesterday, no other acute findings on labs there except for mild hypokalemia.  Will recheck labs for any worsening electrolyte derangement due to dehydration, and treat with fluids and antiemetics.  Will also get CT renal now since it was planned to be done last night before she signed out AMA; low suspicion for renal colic or intra-abdominal  infection but she states this pain is different than her typical post gastric bypass pain that she has had multiple ED visits for before, and she has had no abdominal imaging available for review since 2021.  No current SOB or hypoxia to suggest worsening PE, no significant lower extremity edema currently.      Initial labs with hemoglobin 9, slightly decreased from previous baseline, elevated WBC at 14, and thrombocytosis.  CMP with normal renal function but K 2.9, decreased from previous yesterday.  Lipase is slightly elevated but normal LFTs.  CT renal done for further evaluation that does show some abdominopelvic ascites and remote postoperative changes of gastric bypass, but no acute intra-abdominal process.  It does mention right lower lobe dependent consolidation which could be atelectasis versus pneumonia, but patient denies any cough or fever to suggest pneumonia, more likely atelectasis related to right flank pain.  No evidence for pancreatitis on CT.  On reassessment after IVF and Zofran, as well as IV potassium replacement, patient was sleeping comfortably and denies any further nausea or vomiting.  Given her complex medical history mostly managed at [facility] where we do not have access to their records, unclear patient's actual diagnosis on recent admission, but by description she could have nephrotic syndrome related to lupus, with associated DVT/PE so started on Eliquis.  She is now demanding discharge, and pulled out her IV and is calling her mother to come pick her up.  While waiting for her mother in the ED hallway patient tried to get up and fell forward onto the floor, but no sign of any significant injuries, no head trauma or LOC. This is almost exactly how she left [facility] ED last night; no indication for involuntary hold, patient has capacity to make this decision and return to the ED as needed.  She was given information on [facility]ology [facility] nephrology clinic since she wants to transfer her care  there, and Rx for Zofran and Percocet as needed until then.          Amount and/or Complexity of Data Reviewed  Independent Historian: parent     Details: mother  External Data Reviewed: notes.  Labs: ordered.  Radiology: ordered.    Risk  Prescription drug management.            Scribe Attestation:   Scribe #1: I performed the above scribed service and the documentation accurately describes the services I performed. I attest to the accuracy of the note.         I, Dr. Lloyd Pantoja, personally performed the services described in this documentation. All medical record entries made by the scribe were at my direction and in my presence.  I have reviewed the chart and agree that the record reflects my personal performance and is accurate and complete. Lloyd Pantoja MD.                  Clinical Impression:   Final diagnoses:  [R11.2] Nausea and vomiting, unspecified vomiting type (Primary)  [R10.9] Abdominal pain, unspecified abdominal location        ED Disposition Condition    Discharge Stable          ED Prescriptions       Medication Sig Dispense Start Date End Date Auth. Provider    oxyCODONE-acetaminophen (PERCOCET)  mg per tablet  (Status: Discontinued) Take 1 tablet by mouth every 6 (six) hours as needed for Pain. 12 tablet 11/3/2023 11/3/2023 Lloyd Pantoja MD    ondansetron (ZOFRAN-ODT) 4 MG TbDL  (Status: Discontinued) Take 1 tablet (4 mg total) by mouth every 8 (eight) hours as needed. 15 tablet 11/3/2023 11/3/2023 Lloyd Pantoja MD    ondansetron (ZOFRAN-ODT) 4 MG TbDL Take 1 tablet (4 mg total) by mouth every 8 (eight) hours as needed (Nausea). 15 tablet 11/3/2023 -- Lloyd Pantoja MD    oxyCODONE-acetaminophen (PERCOCET)  mg per tablet Take 1 tablet by mouth every 6 (six) hours as needed for Pain. 12 tablet 11/3/2023 -- Lloyd Pantoja MD          Follow-up Information       Follow up With Specialties Details Why Contact Info    [facility]HE[facility]ATOLOGY  Rheumatology Schedule an appointment as soon as possible for a visit in 1 week  [address] [id number]  [phone]    [facility] NEPHROLOGY Nephrology Schedule an appointment as soon as possible for a visit in 1 week  [address] [address]  [phone]             Lloyd Pantoja MD  11/03/23 2002

## 2023-11-03 NOTE — ED NOTES
Bed: Incoming ED Transfer 1  Expected date:   Expected time:   Means of arrival:   Comments:  32 n.v

## 2023-12-06 ENCOUNTER — HOSPITAL ENCOUNTER (EMERGENCY)
Facility: OTHER | Age: 33
Discharge: HOME | End: 2023-12-06
Attending: INTERNAL MEDICINE
Payer: MEDICARE

## 2023-12-06 VITALS
TEMPERATURE: 99 F | OXYGEN SATURATION: 97 % | SYSTOLIC BLOOD PRESSURE: 111 MMHG | HEART RATE: 101 BPM | BODY MASS INDEX: 15.61 KG/M2 | RESPIRATION RATE: 18 BRPM | WEIGHT: 103 LBS | HEIGHT: 68 IN | DIASTOLIC BLOOD PRESSURE: 66 MMHG

## 2023-12-06 DIAGNOSIS — R07.9 CHEST PAIN: ICD-10-CM

## 2023-12-06 DIAGNOSIS — M79.89 LEG SWELLING: Primary | ICD-10-CM

## 2023-12-06 DIAGNOSIS — R10.9 ABDOMINAL PAIN, UNSPECIFIED ABDOMINAL LOCATION: ICD-10-CM

## 2023-12-06 PROCEDURE — 99283 EMERGENCY DEPT VISIT LOW MDM: CPT

## 2023-12-06 PROCEDURE — 93005 ELECTROCARDIOGRAM TRACING: CPT

## 2023-12-06 PROCEDURE — 93010 ELECTROCARDIOGRAM REPORT: CPT | Mod: ,,, | Performed by: INTERNAL MEDICINE

## 2023-12-06 PROCEDURE — 93010 EKG 12-LEAD: ICD-10-PCS | Mod: ,,, | Performed by: INTERNAL MEDICINE

## 2023-12-07 ENCOUNTER — TELEPHONE (OUTPATIENT)
Dept: EMERGENCY MEDICINE | Facility: OTHER | Age: 33
End: 2023-12-07
Payer: MEDICAID

## 2023-12-07 NOTE — DISCHARGE INSTRUCTIONS
Diagnosis:   1. Leg swelling    2. Chest pain    3. Abdominal pain, unspecified abdominal location        Tests you had showed:   Labs Reviewed   CBC W/ AUTO DIFFERENTIAL   COMPREHENSIVE METABOLIC PANEL   TROPONIN I   LIPASE   URINALYSIS, REFLEX TO URINE CULTURE   B-TYPE NATRIURETIC PEPTIDE   POCT GLUCOSE MONITORING CONTINUOUS      X-Ray Chest AP Portable    (Results Pending)       Treatments you received were:   Medications - No data to display    Home Care Instructions:  - Medications: Continue taking your home medications as prescribed    Follow-Up Plan:  - Follow-up with: Primary care doctor within 1-2  days  - Additional testing and/or evaluation will be directed by your primary doctor    Return to the Emergency Department for symptoms including but not limited to: worsening symptoms, severe back pain, shortness of breath or chest pain, vomiting with inability to hold down fluids, blood in vomit or poop, fevers greater than 100.4°F, passing out/fainting/unconsciousness, or other concerning symptoms.     No future appointments.
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
## 2024-03-12 DIAGNOSIS — G89.4 CHRONIC PAIN SYNDROME: ICD-10-CM

## 2024-03-12 DIAGNOSIS — M54.2 CERVICAL PAIN: Primary | ICD-10-CM

## 2024-03-12 DIAGNOSIS — M47.22 OTHER SPONDYLOSIS WITH RADICULOPATHY, CERVICAL REGION: ICD-10-CM

## 2024-03-28 ENCOUNTER — TELEPHONE (OUTPATIENT)
Dept: REHABILITATION | Facility: HOSPITAL | Age: 34
End: 2024-03-28
Payer: MEDICARE